# Patient Record
Sex: FEMALE | Race: WHITE | NOT HISPANIC OR LATINO | URBAN - METROPOLITAN AREA
[De-identification: names, ages, dates, MRNs, and addresses within clinical notes are randomized per-mention and may not be internally consistent; named-entity substitution may affect disease eponyms.]

---

## 2019-01-25 ENCOUNTER — OUTPATIENT (OUTPATIENT)
Dept: OUTPATIENT SERVICES | Facility: HOSPITAL | Age: 34
LOS: 1 days | End: 2019-01-25

## 2019-01-25 DIAGNOSIS — Z3A.00 WEEKS OF GESTATION OF PREGNANCY NOT SPECIFIED: ICD-10-CM

## 2019-01-25 DIAGNOSIS — O26.899 OTHER SPECIFIED PREGNANCY RELATED CONDITIONS, UNSPECIFIED TRIMESTER: ICD-10-CM

## 2019-01-25 PROCEDURE — 99214 OFFICE O/P EST MOD 30 MIN: CPT

## 2019-01-26 ENCOUNTER — OUTPATIENT (OUTPATIENT)
Dept: OUTPATIENT SERVICES | Facility: HOSPITAL | Age: 34
LOS: 1 days | End: 2019-01-26
Payer: COMMERCIAL

## 2019-01-26 DIAGNOSIS — Z3A.00 WEEKS OF GESTATION OF PREGNANCY NOT SPECIFIED: ICD-10-CM

## 2019-01-26 DIAGNOSIS — O26.899 OTHER SPECIFIED PREGNANCY RELATED CONDITIONS, UNSPECIFIED TRIMESTER: ICD-10-CM

## 2019-01-26 PROCEDURE — 76818 FETAL BIOPHYS PROFILE W/NST: CPT

## 2019-01-26 PROCEDURE — 99214 OFFICE O/P EST MOD 30 MIN: CPT

## 2019-02-04 ENCOUNTER — INPATIENT (INPATIENT)
Facility: HOSPITAL | Age: 34
LOS: 3 days | Discharge: HOME | End: 2019-02-08
Attending: OBSTETRICS & GYNECOLOGY | Admitting: OBSTETRICS & GYNECOLOGY
Payer: COMMERCIAL

## 2019-02-04 VITALS — WEIGHT: 200.62 LBS | HEIGHT: 64 IN

## 2019-02-04 DIAGNOSIS — O26.899 OTHER SPECIFIED PREGNANCY RELATED CONDITIONS, UNSPECIFIED TRIMESTER: ICD-10-CM

## 2019-02-04 DIAGNOSIS — Z3A.00 WEEKS OF GESTATION OF PREGNANCY NOT SPECIFIED: ICD-10-CM

## 2019-02-04 LAB
ALBUMIN SERPL ELPH-MCNC: 3.8 G/DL — SIGNIFICANT CHANGE UP (ref 3.3–5)
ALP SERPL-CCNC: 122 U/L — HIGH (ref 40–120)
ALT FLD-CCNC: 16 U/L — SIGNIFICANT CHANGE UP (ref 10–45)
AMNISURE ROM (RUPTURE OF MEMBRANES): NEGATIVE — SIGNIFICANT CHANGE UP
ANION GAP SERPL CALC-SCNC: 15 MMOL/L — SIGNIFICANT CHANGE UP (ref 5–17)
APPEARANCE UR: ABNORMAL
APTT BLD: 30.6 SEC — SIGNIFICANT CHANGE UP (ref 27.5–36.3)
AST SERPL-CCNC: 16 U/L — SIGNIFICANT CHANGE UP (ref 10–40)
BASOPHILS NFR BLD AUTO: 0.1 % — SIGNIFICANT CHANGE UP (ref 0–2)
BILIRUB SERPL-MCNC: 0.3 MG/DL — SIGNIFICANT CHANGE UP (ref 0.2–1.2)
BILIRUB UR-MCNC: NEGATIVE — SIGNIFICANT CHANGE UP
BLD GP AB SCN SERPL QL: NEGATIVE — SIGNIFICANT CHANGE UP
BUN SERPL-MCNC: 8 MG/DL — SIGNIFICANT CHANGE UP (ref 7–23)
CALCIUM SERPL-MCNC: 9.4 MG/DL — SIGNIFICANT CHANGE UP (ref 8.4–10.5)
CHLORIDE SERPL-SCNC: 103 MMOL/L — SIGNIFICANT CHANGE UP (ref 96–108)
CO2 SERPL-SCNC: 20 MMOL/L — LOW (ref 22–31)
COLOR SPEC: YELLOW — SIGNIFICANT CHANGE UP
CREAT ?TM UR-MCNC: 21 MG/DL — SIGNIFICANT CHANGE UP
CREAT SERPL-MCNC: 0.41 MG/DL — LOW (ref 0.5–1.3)
DIFF PNL FLD: ABNORMAL
EOSINOPHIL NFR BLD AUTO: 0.3 % — SIGNIFICANT CHANGE UP (ref 0–6)
FIBRINOGEN PPP-MCNC: 569 MG/DL — HIGH (ref 258–438)
GLUCOSE SERPL-MCNC: 81 MG/DL — SIGNIFICANT CHANGE UP (ref 70–99)
GLUCOSE UR QL: NEGATIVE — SIGNIFICANT CHANGE UP
HCT VFR BLD CALC: 39.7 % — SIGNIFICANT CHANGE UP (ref 34.5–45)
HGB BLD-MCNC: 13 G/DL — SIGNIFICANT CHANGE UP (ref 11.5–15.5)
INR BLD: 0.98 — SIGNIFICANT CHANGE UP (ref 0.88–1.16)
KETONES UR-MCNC: NEGATIVE — SIGNIFICANT CHANGE UP
LDH SERPL L TO P-CCNC: 166 U/L — SIGNIFICANT CHANGE UP (ref 50–242)
LEUKOCYTE ESTERASE UR-ACNC: ABNORMAL
LYMPHOCYTES # BLD AUTO: 7 % — LOW (ref 13–44)
MCHC RBC-ENTMCNC: 28.6 PG — SIGNIFICANT CHANGE UP (ref 27–34)
MCHC RBC-ENTMCNC: 32.7 G/DL — SIGNIFICANT CHANGE UP (ref 32–36)
MCV RBC AUTO: 87.4 FL — SIGNIFICANT CHANGE UP (ref 80–100)
MONOCYTES NFR BLD AUTO: 7.4 % — SIGNIFICANT CHANGE UP (ref 2–14)
NEUTROPHILS NFR BLD AUTO: 85.2 % — HIGH (ref 43–77)
NITRITE UR-MCNC: NEGATIVE — SIGNIFICANT CHANGE UP
PH UR: 6.5 — SIGNIFICANT CHANGE UP (ref 5–8)
PLATELET # BLD AUTO: 240 K/UL — SIGNIFICANT CHANGE UP (ref 150–400)
POTASSIUM SERPL-MCNC: 4.3 MMOL/L — SIGNIFICANT CHANGE UP (ref 3.5–5.3)
POTASSIUM SERPL-SCNC: 4.3 MMOL/L — SIGNIFICANT CHANGE UP (ref 3.5–5.3)
PROT ?TM UR-MCNC: 10 MG/DL — SIGNIFICANT CHANGE UP (ref 0–12)
PROT SERPL-MCNC: 7.6 G/DL — SIGNIFICANT CHANGE UP (ref 6–8.3)
PROT UR-MCNC: NEGATIVE MG/DL — SIGNIFICANT CHANGE UP
PROT/CREAT UR-RTO: 0.5 RATIO — HIGH (ref 0–0.2)
PROTHROM AB SERPL-ACNC: 11.1 SEC — SIGNIFICANT CHANGE UP (ref 10–12.9)
RBC # BLD: 4.54 M/UL — SIGNIFICANT CHANGE UP (ref 3.8–5.2)
RBC # FLD: 14.9 % — SIGNIFICANT CHANGE UP (ref 10.3–16.9)
RH IG SCN BLD-IMP: POSITIVE — SIGNIFICANT CHANGE UP
RH IG SCN BLD-IMP: POSITIVE — SIGNIFICANT CHANGE UP
SODIUM SERPL-SCNC: 138 MMOL/L — SIGNIFICANT CHANGE UP (ref 135–145)
SP GR SPEC: 1.01 — SIGNIFICANT CHANGE UP (ref 1–1.03)
URATE SERPL-MCNC: 4.3 MG/DL — SIGNIFICANT CHANGE UP (ref 2.5–7)
UROBILINOGEN FLD QL: 0.2 E.U./DL — SIGNIFICANT CHANGE UP
WBC # BLD: 18.4 K/UL — HIGH (ref 3.8–10.5)
WBC # FLD AUTO: 18.4 K/UL — HIGH (ref 3.8–10.5)

## 2019-02-04 RX ORDER — OXYCODONE AND ACETAMINOPHEN 5; 325 MG/1; MG/1
2 TABLET ORAL EVERY 6 HOURS
Qty: 0 | Refills: 0 | Status: DISCONTINUED | OUTPATIENT
Start: 2019-02-04 | End: 2019-02-08

## 2019-02-04 RX ORDER — OXYTOCIN 10 UNIT/ML
41.67 VIAL (ML) INJECTION
Qty: 20 | Refills: 0 | Status: DISCONTINUED | OUTPATIENT
Start: 2019-02-04 | End: 2019-02-08

## 2019-02-04 RX ORDER — PENICILLIN G POTASSIUM 5000000 [IU]/1
POWDER, FOR SOLUTION INTRAMUSCULAR; INTRAPLEURAL; INTRATHECAL; INTRAVENOUS
Qty: 0 | Refills: 0 | Status: DISCONTINUED | OUTPATIENT
Start: 2019-02-04 | End: 2019-02-08

## 2019-02-04 RX ORDER — PENICILLIN G POTASSIUM 5000000 [IU]/1
2.5 POWDER, FOR SOLUTION INTRAMUSCULAR; INTRAPLEURAL; INTRATHECAL; INTRAVENOUS EVERY 4 HOURS
Qty: 0 | Refills: 0 | Status: DISCONTINUED | OUTPATIENT
Start: 2019-02-04 | End: 2019-02-08

## 2019-02-04 RX ORDER — HEPARIN SODIUM 5000 [USP'U]/ML
5000 INJECTION INTRAVENOUS; SUBCUTANEOUS EVERY 12 HOURS
Qty: 0 | Refills: 0 | Status: DISCONTINUED | OUTPATIENT
Start: 2019-02-05 | End: 2019-02-08

## 2019-02-04 RX ORDER — MORPHINE SULFATE 50 MG/1
0.3 CAPSULE, EXTENDED RELEASE ORAL ONCE
Qty: 0 | Refills: 0 | Status: DISCONTINUED | OUTPATIENT
Start: 2019-02-04 | End: 2019-02-08

## 2019-02-04 RX ORDER — PENICILLIN G POTASSIUM 5000000 [IU]/1
5 POWDER, FOR SOLUTION INTRAMUSCULAR; INTRAPLEURAL; INTRATHECAL; INTRAVENOUS ONCE
Qty: 0 | Refills: 0 | Status: COMPLETED | OUTPATIENT
Start: 2019-02-04 | End: 2019-02-04

## 2019-02-04 RX ORDER — OXYCODONE AND ACETAMINOPHEN 5; 325 MG/1; MG/1
1 TABLET ORAL
Qty: 0 | Refills: 0 | Status: DISCONTINUED | OUTPATIENT
Start: 2019-02-04 | End: 2019-02-08

## 2019-02-04 RX ORDER — TETANUS TOXOID, REDUCED DIPHTHERIA TOXOID AND ACELLULAR PERTUSSIS VACCINE, ADSORBED 5; 2.5; 8; 8; 2.5 [IU]/.5ML; [IU]/.5ML; UG/.5ML; UG/.5ML; UG/.5ML
0.5 SUSPENSION INTRAMUSCULAR ONCE
Qty: 0 | Refills: 0 | Status: COMPLETED | OUTPATIENT
Start: 2019-02-04 | End: 2020-01-03

## 2019-02-04 RX ORDER — ACETAMINOPHEN 500 MG
650 TABLET ORAL EVERY 6 HOURS
Qty: 0 | Refills: 0 | Status: DISCONTINUED | OUTPATIENT
Start: 2019-02-04 | End: 2019-02-08

## 2019-02-04 RX ORDER — OXYCODONE HYDROCHLORIDE 5 MG/1
5 TABLET ORAL
Qty: 0 | Refills: 0 | Status: DISCONTINUED | OUTPATIENT
Start: 2019-02-04 | End: 2019-02-08

## 2019-02-04 RX ORDER — LANOLIN
1 OINTMENT (GRAM) TOPICAL
Qty: 0 | Refills: 0 | Status: DISCONTINUED | OUTPATIENT
Start: 2019-02-04 | End: 2019-02-08

## 2019-02-04 RX ORDER — OXYCODONE HYDROCHLORIDE 5 MG/1
10 TABLET ORAL
Qty: 0 | Refills: 0 | Status: DISCONTINUED | OUTPATIENT
Start: 2019-02-04 | End: 2019-02-08

## 2019-02-04 RX ORDER — IBUPROFEN 200 MG
600 TABLET ORAL EVERY 6 HOURS
Qty: 0 | Refills: 0 | Status: DISCONTINUED | OUTPATIENT
Start: 2019-02-04 | End: 2019-02-08

## 2019-02-04 RX ORDER — SODIUM CHLORIDE 9 MG/ML
1000 INJECTION, SOLUTION INTRAVENOUS
Qty: 0 | Refills: 0 | Status: DISCONTINUED | OUTPATIENT
Start: 2019-02-04 | End: 2019-02-08

## 2019-02-04 RX ORDER — CITRIC ACID/SODIUM CITRATE 300-500 MG
15 SOLUTION, ORAL ORAL EVERY 4 HOURS
Qty: 0 | Refills: 0 | Status: DISCONTINUED | OUTPATIENT
Start: 2019-02-04 | End: 2019-02-04

## 2019-02-04 RX ORDER — CEFAZOLIN SODIUM 1 G
2000 VIAL (EA) INJECTION ONCE
Qty: 0 | Refills: 0 | Status: COMPLETED | OUTPATIENT
Start: 2019-02-04 | End: 2019-02-04

## 2019-02-04 RX ORDER — SODIUM CHLORIDE 9 MG/ML
1000 INJECTION, SOLUTION INTRAVENOUS ONCE
Qty: 0 | Refills: 0 | Status: COMPLETED | OUTPATIENT
Start: 2019-02-04 | End: 2019-02-04

## 2019-02-04 RX ORDER — FERROUS SULFATE 325(65) MG
325 TABLET ORAL DAILY
Qty: 0 | Refills: 0 | Status: DISCONTINUED | OUTPATIENT
Start: 2019-02-04 | End: 2019-02-08

## 2019-02-04 RX ORDER — AZITHROMYCIN 500 MG/1
500 TABLET, FILM COATED ORAL ONCE
Qty: 0 | Refills: 0 | Status: COMPLETED | OUTPATIENT
Start: 2019-02-04 | End: 2019-02-04

## 2019-02-04 RX ORDER — SIMETHICONE 80 MG/1
80 TABLET, CHEWABLE ORAL EVERY 4 HOURS
Qty: 0 | Refills: 0 | Status: DISCONTINUED | OUTPATIENT
Start: 2019-02-04 | End: 2019-02-08

## 2019-02-04 RX ORDER — HYDROMORPHONE HYDROCHLORIDE 2 MG/ML
1 INJECTION INTRAMUSCULAR; INTRAVENOUS; SUBCUTANEOUS
Qty: 0 | Refills: 0 | Status: DISCONTINUED | OUTPATIENT
Start: 2019-02-04 | End: 2019-02-08

## 2019-02-04 RX ORDER — NALOXONE HYDROCHLORIDE 4 MG/.1ML
0.1 SPRAY NASAL
Qty: 0 | Refills: 0 | Status: DISCONTINUED | OUTPATIENT
Start: 2019-02-04 | End: 2019-02-08

## 2019-02-04 RX ORDER — DOCUSATE SODIUM 100 MG
100 CAPSULE ORAL
Qty: 0 | Refills: 0 | Status: DISCONTINUED | OUTPATIENT
Start: 2019-02-04 | End: 2019-02-08

## 2019-02-04 RX ORDER — IBUPROFEN 200 MG
600 TABLET ORAL ONCE
Qty: 0 | Refills: 0 | Status: DISCONTINUED | OUTPATIENT
Start: 2019-02-04 | End: 2019-02-08

## 2019-02-04 RX ORDER — OXYTOCIN 10 UNIT/ML
333.33 VIAL (ML) INJECTION
Qty: 20 | Refills: 0 | Status: DISCONTINUED | OUTPATIENT
Start: 2019-02-04 | End: 2019-02-04

## 2019-02-04 RX ORDER — GLYCERIN ADULT
1 SUPPOSITORY, RECTAL RECTAL AT BEDTIME
Qty: 0 | Refills: 0 | Status: DISCONTINUED | OUTPATIENT
Start: 2019-02-04 | End: 2019-02-08

## 2019-02-04 RX ORDER — ONDANSETRON 8 MG/1
4 TABLET, FILM COATED ORAL EVERY 6 HOURS
Qty: 0 | Refills: 0 | Status: DISCONTINUED | OUTPATIENT
Start: 2019-02-04 | End: 2019-02-08

## 2019-02-04 RX ORDER — DIPHENHYDRAMINE HCL 50 MG
25 CAPSULE ORAL EVERY 6 HOURS
Qty: 0 | Refills: 0 | Status: DISCONTINUED | OUTPATIENT
Start: 2019-02-04 | End: 2019-02-08

## 2019-02-04 RX ORDER — AZITHROMYCIN 500 MG/1
500 TABLET, FILM COATED ORAL ONCE
Qty: 0 | Refills: 0 | Status: DISCONTINUED | OUTPATIENT
Start: 2019-02-04 | End: 2019-02-04

## 2019-02-04 RX ORDER — SODIUM CHLORIDE 9 MG/ML
1000 INJECTION, SOLUTION INTRAVENOUS
Qty: 0 | Refills: 0 | Status: DISCONTINUED | OUTPATIENT
Start: 2019-02-04 | End: 2019-02-04

## 2019-02-04 RX ADMIN — SODIUM CHLORIDE 2000 MILLILITER(S): 9 INJECTION, SOLUTION INTRAVENOUS at 17:00

## 2019-02-04 RX ADMIN — Medication 100 MILLIGRAM(S): at 19:15

## 2019-02-04 RX ADMIN — SODIUM CHLORIDE 125 MILLILITER(S): 9 INJECTION, SOLUTION INTRAVENOUS at 16:16

## 2019-02-04 RX ADMIN — PENICILLIN G POTASSIUM 100 MILLION UNIT(S): 5000000 POWDER, FOR SOLUTION INTRAMUSCULAR; INTRAPLEURAL; INTRATHECAL; INTRAVENOUS at 16:50

## 2019-02-04 RX ADMIN — AZITHROMYCIN 255 MILLIGRAM(S): 500 TABLET, FILM COATED ORAL at 19:45

## 2019-02-05 LAB
BASOPHILS NFR BLD AUTO: 2 % — SIGNIFICANT CHANGE UP (ref 0–2)
HCT VFR BLD CALC: 32.6 % — LOW (ref 34.5–45)
HGB BLD-MCNC: 10.5 G/DL — LOW (ref 11.5–15.5)
LYMPHOCYTES # BLD AUTO: 7 % — LOW (ref 13–44)
MCHC RBC-ENTMCNC: 28 PG — SIGNIFICANT CHANGE UP (ref 27–34)
MCHC RBC-ENTMCNC: 32.2 G/DL — SIGNIFICANT CHANGE UP (ref 32–36)
MCV RBC AUTO: 86.9 FL — SIGNIFICANT CHANGE UP (ref 80–100)
MONOCYTES NFR BLD AUTO: 2 % — SIGNIFICANT CHANGE UP (ref 2–14)
NEUTROPHILS NFR BLD AUTO: 82 % — HIGH (ref 43–77)
PLATELET # BLD AUTO: 231 K/UL — SIGNIFICANT CHANGE UP (ref 150–400)
RBC # BLD: 3.75 M/UL — LOW (ref 3.8–5.2)
RBC # FLD: 14.7 % — SIGNIFICANT CHANGE UP (ref 10.3–16.9)
T PALLIDUM AB TITR SER: NEGATIVE — SIGNIFICANT CHANGE UP
WBC # BLD: 22.3 K/UL — HIGH (ref 3.8–10.5)
WBC # FLD AUTO: 22.3 K/UL — HIGH (ref 3.8–10.5)

## 2019-02-05 RX ADMIN — Medication 600 MILLIGRAM(S): at 01:41

## 2019-02-05 RX ADMIN — Medication 1 TABLET(S): at 12:36

## 2019-02-05 RX ADMIN — Medication 600 MILLIGRAM(S): at 15:49

## 2019-02-05 RX ADMIN — Medication 100 MILLIGRAM(S): at 22:22

## 2019-02-05 RX ADMIN — HEPARIN SODIUM 5000 UNIT(S): 5000 INJECTION INTRAVENOUS; SUBCUTANEOUS at 09:14

## 2019-02-05 RX ADMIN — Medication 600 MILLIGRAM(S): at 09:15

## 2019-02-05 RX ADMIN — Medication 325 MILLIGRAM(S): at 12:36

## 2019-02-05 RX ADMIN — Medication 600 MILLIGRAM(S): at 09:56

## 2019-02-05 RX ADMIN — SIMETHICONE 80 MILLIGRAM(S): 80 TABLET, CHEWABLE ORAL at 22:22

## 2019-02-05 RX ADMIN — HEPARIN SODIUM 5000 UNIT(S): 5000 INJECTION INTRAVENOUS; SUBCUTANEOUS at 22:22

## 2019-02-05 RX ADMIN — Medication 600 MILLIGRAM(S): at 15:09

## 2019-02-05 RX ADMIN — Medication 600 MILLIGRAM(S): at 22:28

## 2019-02-05 RX ADMIN — Medication 600 MILLIGRAM(S): at 02:40

## 2019-02-05 RX ADMIN — Medication 600 MILLIGRAM(S): at 21:58

## 2019-02-05 NOTE — PROGRESS NOTE ADULT - ATTENDING COMMENTS
10.5   22.3  )-----------( 231      ( 05 Feb 2019 06:03 )             32.6   doing well  pod 1  oob  reg diet when passing flatus

## 2019-02-05 NOTE — PROGRESS NOTE ADULT - ASSESSMENT
33y Female POD#1   s/p C/S, Uncomplicated                                       1. Neuro/Pain:  OPM  2  CV:  VS per routine  3. Pulm: Encourage ISS & Ambulation  4. GI:  Advance as michelle  5. : Hill in place, TOV later today after removal of hill  6. DVT ppx: SCDs, SQH 5000 mg BID  7. Dispo: POD #3 or #4

## 2019-02-05 NOTE — LACTATION INITIAL EVALUATION - NS LACT CON REASON FOR REQ
Mother reports baby has been attempting to feed but she is unsure if baby has actually latched. Positioning and deep latch strategies taught, and with assist and many attempts, baby was able to latch deeply on left breast in cross cradle hold, sucking rhythmically between pauses of rest. Mother reports no pain with latch. Breastfeeding basics and normal  behavior reviewed, including input/output expectations. Mother instructed to breastfeed on demand at least 8-12x/day, perform plenty of S2S contact, room-in. Answered questions and provided outside lactation support resources. Mother knows to ask for LC assistance as needed. Baby 39.4 GA, currently 17 hours old, 2940 grams,  2 urine/4 stool diapers, no recorded weight loss. Grandmother seems to be somewhat overbearing and has suggested formula, though mother does not have an interest in it./primaparous mom

## 2019-02-05 NOTE — PROGRESS NOTE ADULT - SUBJECTIVE AND OBJECTIVE BOX
Patient evaluated at bedside.   She reports pain is well controlled.  She denies headache, dizziness, chest pain, palpitations, shortness of breath, nausea, vomiting or heavy vaginal bleeding.  She has not yet ambulated, has hill in place, is not yet passing gas and is tolerating clears.  Physical Exam:  Vital Signs Last 24 Hrs  T(C): 37 (05 Feb 2019 02:00), Max: 37.3 (04 Feb 2019 23:15)  T(F): 98.6 (05 Feb 2019 02:00), Max: 99.2 (04 Feb 2019 23:15)  HR: 93 (05 Feb 2019 02:00) (85 - 98)  BP: 111/72 (05 Feb 2019 02:00) (103/51 - 123/78)  BP(mean): --  RR: 17 (05 Feb 2019 02:00) (17 - 20)  SpO2: 97% (05 Feb 2019 02:00) (97% - 98%)    02-04 @ 07:01  -  02-05 @ 06:16  --------------------------------------------------------  IN: 2700 mL / OUT: 1640 mL / NET: 1060 mL        GA: NAD, A+0 x 3  CV: RRR  Pulm: Normal work of breathing  Breasts: soft, nontender, no palpable masses  Abd: + BS, soft, nontender, nondistended, no rebound or guarding, uterus firm at midline,  fundus below umbilicus  Dressing clean/dry/intact  : lochia WNL  Extremities: no swelling or calf tenderness                            13.0   18.4  )-----------( 240      ( 04 Feb 2019 16:45 )             39.7     02-04    138  |  103  |  8   ----------------------------<  81  4.3   |  20<L>  |  0.41<L>    Ca    9.4      04 Feb 2019 16:44    TPro  7.6  /  Alb  3.8  /  TBili  0.3  /  DBili  x   /  AST  16  /  ALT  16  /  AlkPhos  122<H>  02-04      PT/INR - ( 04 Feb 2019 16:44 )   PT: 11.1 sec;   INR: 0.98          PTT - ( 04 Feb 2019 16:44 )  PTT:30.6 sec

## 2019-02-06 RX ADMIN — Medication 1 TABLET(S): at 12:20

## 2019-02-06 RX ADMIN — Medication 650 MILLIGRAM(S): at 18:02

## 2019-02-06 RX ADMIN — Medication 650 MILLIGRAM(S): at 13:38

## 2019-02-06 RX ADMIN — Medication 600 MILLIGRAM(S): at 09:06

## 2019-02-06 RX ADMIN — Medication 600 MILLIGRAM(S): at 10:00

## 2019-02-06 RX ADMIN — Medication 600 MILLIGRAM(S): at 21:50

## 2019-02-06 RX ADMIN — Medication 650 MILLIGRAM(S): at 19:00

## 2019-02-06 RX ADMIN — HEPARIN SODIUM 5000 UNIT(S): 5000 INJECTION INTRAVENOUS; SUBCUTANEOUS at 21:11

## 2019-02-06 RX ADMIN — Medication 600 MILLIGRAM(S): at 21:11

## 2019-02-06 RX ADMIN — Medication 650 MILLIGRAM(S): at 12:20

## 2019-02-06 RX ADMIN — Medication 600 MILLIGRAM(S): at 03:55

## 2019-02-06 RX ADMIN — Medication 650 MILLIGRAM(S): at 01:00

## 2019-02-06 RX ADMIN — Medication 650 MILLIGRAM(S): at 06:48

## 2019-02-06 RX ADMIN — Medication 600 MILLIGRAM(S): at 03:25

## 2019-02-06 RX ADMIN — Medication 650 MILLIGRAM(S): at 07:18

## 2019-02-06 RX ADMIN — HEPARIN SODIUM 5000 UNIT(S): 5000 INJECTION INTRAVENOUS; SUBCUTANEOUS at 09:06

## 2019-02-06 RX ADMIN — Medication 600 MILLIGRAM(S): at 16:44

## 2019-02-06 RX ADMIN — Medication 650 MILLIGRAM(S): at 00:30

## 2019-02-06 RX ADMIN — Medication 325 MILLIGRAM(S): at 12:20

## 2019-02-06 RX ADMIN — SIMETHICONE 80 MILLIGRAM(S): 80 TABLET, CHEWABLE ORAL at 03:25

## 2019-02-06 RX ADMIN — Medication 600 MILLIGRAM(S): at 15:39

## 2019-02-06 NOTE — PROGRESS NOTE ADULT - SUBJECTIVE AND OBJECTIVE BOX
Patient evaluated at bedside.   She reports pain is well controlled.  She denies headache, dizziness, chest pain, palpitations, shortness of breathe, nausea, vomiting or heavy vaginal bleeding.  She has been ambulating without assistance, voiding spontaneously, passing gas, tolerating regular diet and is breastfeeding.    Physical Exam:  Vital Signs Last 24 Hrs  T(C): 36.8 (06 Feb 2019 01:59), Max: 37 (05 Feb 2019 10:00)  T(F): 98.2 (06 Feb 2019 01:59), Max: 98.6 (05 Feb 2019 10:00)  HR: 93 (06 Feb 2019 01:59) (90 - 99)  BP: 106/69 (06 Feb 2019 01:59) (92/59 - 114/58)  BP(mean): --  RR: 17 (06 Feb 2019 01:59) (17 - 18)  SpO2: 97% (06 Feb 2019 01:59) (96% - 100%)    02-05 @ 07:01  -  02-06 @ 07:00  --------------------------------------------------------  IN: 0 mL / OUT: 1950 mL / NET: -1950 mL        GA: NAD, A+0 x 3  CV: RRR  Pulm: Normal work of breathing  Breasts: soft, nontender, no palpable masses  Abd: + BS, soft, nontender, nondistended, no rebound or guarding, uterus firm at midline,  fundus below umbilicus  Incision: well approximated, no erythema or discharge  : lochia WNL  Extremities: no swelling or calf tenderness                            10.5   22.3  )-----------( 231      ( 05 Feb 2019 06:03 )             32.6     02-04    138  |  103  |  8   ----------------------------<  81  4.3   |  20<L>  |  0.41<L>    Ca    9.4      04 Feb 2019 16:44    TPro  7.6  /  Alb  3.8  /  TBili  0.3  /  DBili  x   /  AST  16  /  ALT  16  /  AlkPhos  122<H>  02-04      PT/INR - ( 04 Feb 2019 16:44 )   PT: 11.1 sec;   INR: 0.98          PTT - ( 04 Feb 2019 16:44 )  PTT:30.6 sec

## 2019-02-06 NOTE — PROGRESS NOTE ADULT - ASSESSMENT
33y Female POD#2    s/p C/S, Uncomplicated                                       1. Neuro/Pain:  OPM  2  CV:  VS per routine  3. Pulm: Encourage ISS & Ambulation  4. GI:  Reg  5. : Voiding  6. DVT ppx: SCDs, SQH 5000 mg BID  7. Dispo: POD #3 or #4

## 2019-02-07 RX ORDER — TETANUS TOXOID, REDUCED DIPHTHERIA TOXOID AND ACELLULAR PERTUSSIS VACCINE, ADSORBED 5; 2.5; 8; 8; 2.5 [IU]/.5ML; [IU]/.5ML; UG/.5ML; UG/.5ML; UG/.5ML
0.5 SUSPENSION INTRAMUSCULAR ONCE
Qty: 0 | Refills: 0 | Status: COMPLETED | OUTPATIENT
Start: 2019-02-07 | End: 2019-02-07

## 2019-02-07 RX ADMIN — Medication 650 MILLIGRAM(S): at 07:02

## 2019-02-07 RX ADMIN — Medication 1 TABLET(S): at 12:24

## 2019-02-07 RX ADMIN — Medication 650 MILLIGRAM(S): at 06:06

## 2019-02-07 RX ADMIN — Medication 600 MILLIGRAM(S): at 09:46

## 2019-02-07 RX ADMIN — TETANUS TOXOID, REDUCED DIPHTHERIA TOXOID AND ACELLULAR PERTUSSIS VACCINE, ADSORBED 0.5 MILLILITER(S): 5; 2.5; 8; 8; 2.5 SUSPENSION INTRAMUSCULAR at 22:44

## 2019-02-07 RX ADMIN — Medication 650 MILLIGRAM(S): at 12:24

## 2019-02-07 RX ADMIN — Medication 650 MILLIGRAM(S): at 19:00

## 2019-02-07 RX ADMIN — HEPARIN SODIUM 5000 UNIT(S): 5000 INJECTION INTRAVENOUS; SUBCUTANEOUS at 09:07

## 2019-02-07 RX ADMIN — Medication 600 MILLIGRAM(S): at 04:00

## 2019-02-07 RX ADMIN — Medication 600 MILLIGRAM(S): at 09:07

## 2019-02-07 RX ADMIN — Medication 600 MILLIGRAM(S): at 15:04

## 2019-02-07 RX ADMIN — Medication 600 MILLIGRAM(S): at 21:08

## 2019-02-07 RX ADMIN — Medication 600 MILLIGRAM(S): at 16:00

## 2019-02-07 RX ADMIN — Medication 600 MILLIGRAM(S): at 03:28

## 2019-02-07 RX ADMIN — Medication 100 MILLIGRAM(S): at 21:10

## 2019-02-07 RX ADMIN — Medication 325 MILLIGRAM(S): at 12:24

## 2019-02-07 RX ADMIN — HEPARIN SODIUM 5000 UNIT(S): 5000 INJECTION INTRAVENOUS; SUBCUTANEOUS at 21:08

## 2019-02-07 RX ADMIN — Medication 650 MILLIGRAM(S): at 01:00

## 2019-02-07 RX ADMIN — Medication 650 MILLIGRAM(S): at 13:10

## 2019-02-07 RX ADMIN — Medication 600 MILLIGRAM(S): at 22:08

## 2019-02-07 RX ADMIN — Medication 650 MILLIGRAM(S): at 00:40

## 2019-02-07 RX ADMIN — Medication 650 MILLIGRAM(S): at 18:19

## 2019-02-07 NOTE — PROGRESS NOTE ADULT - SUBJECTIVE AND OBJECTIVE BOX
Patient evaluated at bedside.   She reports pain is well controlled.  She denies headache, dizziness, chest pain, palpitations, shortness of breathe, nausea, vomiting or heavy vaginal bleeding.  She has been ambulating without assistance, voiding spontaneously, passing gas, tolerating regular diet and is breastfeeding.    Physical Exam:  Vital Signs Last 24 Hrs  T(C): 36.3 (07 Feb 2019 06:00), Max: 36.7 (06 Feb 2019 19:40)  T(F): 97.4 (07 Feb 2019 06:00), Max: 98 (06 Feb 2019 19:40)  HR: 85 (07 Feb 2019 06:00) (85 - 100)  BP: 113/73 (07 Feb 2019 06:00) (106/73 - 113/73)  BP(mean): --  RR: 17 (07 Feb 2019 06:00) (17 - 18)  SpO2: 98% (07 Feb 2019 06:00) (98% - 99%)      GA: NAD, A+0 x 3  CV: RRR  Pulm: Normal work of breathing  Breasts: soft, nontender, no palpable masses  Abd: + BS, soft, nontender, nondistended, no rebound or guarding, uterus firm at midline,  fundus below umbilicus  Incision: well approximated, no erythema or discharge  : lochia WNL  Extremities: no swelling or calf tenderness

## 2019-02-07 NOTE — PROGRESS NOTE ADULT - ASSESSMENT
33y Female POD# 3   s/p C/S, Uncomplicated                                       1. Neuro/Pain:  OPM  2  CV:  VS per routine  3. Pulm: Encourage ISS & Ambulation  4. GI:  Reg  5. : Voiding  6. DVT ppx: SCDs, SQH 5000 mg BID  7. Dispo: POD #3 or #4

## 2019-02-08 ENCOUNTER — TRANSCRIPTION ENCOUNTER (OUTPATIENT)
Age: 34
End: 2019-02-08

## 2019-02-08 VITALS
HEART RATE: 81 BPM | TEMPERATURE: 97 F | DIASTOLIC BLOOD PRESSURE: 85 MMHG | OXYGEN SATURATION: 100 % | SYSTOLIC BLOOD PRESSURE: 130 MMHG | RESPIRATION RATE: 18 BRPM

## 2019-02-08 LAB — SURGICAL PATHOLOGY STUDY: SIGNIFICANT CHANGE UP

## 2019-02-08 PROCEDURE — 83615 LACTATE (LD) (LDH) ENZYME: CPT

## 2019-02-08 PROCEDURE — 88307 TISSUE EXAM BY PATHOLOGIST: CPT

## 2019-02-08 PROCEDURE — 82570 ASSAY OF URINE CREATININE: CPT

## 2019-02-08 PROCEDURE — 84550 ASSAY OF BLOOD/URIC ACID: CPT

## 2019-02-08 PROCEDURE — C1889: CPT

## 2019-02-08 PROCEDURE — 99214 OFFICE O/P EST MOD 30 MIN: CPT

## 2019-02-08 PROCEDURE — 85730 THROMBOPLASTIN TIME PARTIAL: CPT

## 2019-02-08 PROCEDURE — 86900 BLOOD TYPING SEROLOGIC ABO: CPT

## 2019-02-08 PROCEDURE — 36415 COLL VENOUS BLD VENIPUNCTURE: CPT

## 2019-02-08 PROCEDURE — 90715 TDAP VACCINE 7 YRS/> IM: CPT

## 2019-02-08 PROCEDURE — 86592 SYPHILIS TEST NON-TREP QUAL: CPT

## 2019-02-08 PROCEDURE — 81001 URINALYSIS AUTO W/SCOPE: CPT

## 2019-02-08 PROCEDURE — 86850 RBC ANTIBODY SCREEN: CPT

## 2019-02-08 PROCEDURE — 85610 PROTHROMBIN TIME: CPT

## 2019-02-08 PROCEDURE — 86780 TREPONEMA PALLIDUM: CPT

## 2019-02-08 PROCEDURE — 85025 COMPLETE CBC W/AUTO DIFF WBC: CPT

## 2019-02-08 PROCEDURE — 80053 COMPREHEN METABOLIC PANEL: CPT

## 2019-02-08 PROCEDURE — C1765: CPT

## 2019-02-08 PROCEDURE — 84156 ASSAY OF PROTEIN URINE: CPT

## 2019-02-08 PROCEDURE — 85384 FIBRINOGEN ACTIVITY: CPT

## 2019-02-08 PROCEDURE — 84112 EVAL AMNIOTIC FLUID PROTEIN: CPT

## 2019-02-08 PROCEDURE — 86901 BLOOD TYPING SEROLOGIC RH(D): CPT

## 2019-02-08 RX ORDER — THYROID 120 MG
1 TABLET ORAL
Qty: 0 | Refills: 0 | COMMUNITY

## 2019-02-08 RX ADMIN — HEPARIN SODIUM 5000 UNIT(S): 5000 INJECTION INTRAVENOUS; SUBCUTANEOUS at 09:15

## 2019-02-08 RX ADMIN — Medication 325 MILLIGRAM(S): at 09:14

## 2019-02-08 RX ADMIN — Medication 650 MILLIGRAM(S): at 07:34

## 2019-02-08 RX ADMIN — Medication 600 MILLIGRAM(S): at 13:37

## 2019-02-08 RX ADMIN — Medication 650 MILLIGRAM(S): at 07:04

## 2019-02-08 RX ADMIN — Medication 1 TABLET(S): at 09:14

## 2019-02-08 RX ADMIN — Medication 600 MILLIGRAM(S): at 03:51

## 2019-02-08 RX ADMIN — Medication 600 MILLIGRAM(S): at 04:51

## 2019-02-08 RX ADMIN — Medication 600 MILLIGRAM(S): at 14:18

## 2019-02-08 NOTE — DISCHARGE NOTE OB - PATIENT PORTAL LINK FT
You can access the [facility] Patient Portal, offered by [facility], by registering with the following website: [email or web address]

## 2019-02-08 NOTE — DISCHARGE NOTE OB - CARE PROVIDER_API CALL
Laura Gallagher (DO)  Obstetrics and Gynecology  [address] [place] [address]  Phone: [phone]  Fax: [phone]  Follow Up Time:

## 2019-02-08 NOTE — DISCHARGE NOTE OB - PLAN OF CARE
pp recovery advil 4 tabs every 8 hrs as needed, call with any problems, follow up at office in   2weeks

## 2019-02-08 NOTE — PROGRESS NOTE ADULT - SUBJECTIVE AND OBJECTIVE BOX
pt doing well  ICU Vital Signs Last 24 Hrs  T(C): 36.3 (08 Feb 2019 02:30), Max: 36.8 (07 Feb 2019 17:45)  T(F): 97.4 (08 Feb 2019 02:30), Max: 98.3 (07 Feb 2019 17:45)  HR: 81 (08 Feb 2019 02:30) (74 - 81)  BP: 130/85 (08 Feb 2019 02:30) (116/78 - 130/85)    RR: 18 (08 Feb 2019 02:30) (18 - 18)  SpO2: 100% (08 Feb 2019 02:30) (99% - 100%)  abdomen soft, nd  incision c/d/i  neg calf tenderness    pod 4 s/p c/s  for dc

## 2019-02-08 NOTE — DISCHARGE NOTE OB - CARE PLAN
Principal Discharge DX:	 delivery delivered  Goal:	pp recovery  Assessment and plan of treatment:	advil 4 tabs every 8 hrs as needed, call with any problems, follow up at office in   2weeks

## 2019-02-08 NOTE — PROGRESS NOTE ADULT - SUBJECTIVE AND OBJECTIVE BOX
Patient evaluated at bedside.   She reports pain is well controlled.  She denies headache, dizziness, chest pain, palpitations, shortness of breathe, nausea, vomiting or heavy vaginal bleeding.  She has been ambulating without assistance, voiding spontaneously, passing gas, tolerating regular diet and is breastfeeding.    Physical Exam:  Vital Signs Last 24 Hrs  T(C): 36.3 (08 Feb 2019 02:30), Max: 36.8 (07 Feb 2019 17:45)  T(F): 97.4 (08 Feb 2019 02:30), Max: 98.3 (07 Feb 2019 17:45)  HR: 81 (08 Feb 2019 02:30) (74 - 81)  BP: 130/85 (08 Feb 2019 02:30) (116/78 - 130/85)  BP(mean): --  RR: 18 (08 Feb 2019 02:30) (18 - 18)  SpO2: 100% (08 Feb 2019 02:30) (99% - 100%)      GA: NAD, A+0 x 3  CV: RRR  Pulm: Normal work of breathing  Breasts: soft, nontender, no palpable masses  Abd: + BS, soft, nontender, nondistended, no rebound or guarding, uterus firm at midline,  fundus below umbilicus  Incision: well approximated, no erythema or discharge  : lochia WNL  Extremities: no swelling or calf tenderness

## 2019-02-08 NOTE — PROGRESS NOTE ADULT - ASSESSMENT
33y Female POD#4   s/p C/S, Uncomplicated                                       1. Neuro/Pain:  OPM  2  CV:  VS per routine  3. Pulm: Encourage ISS & Ambulation  4. GI:  Reg  5. : Voiding  6. DVT ppx: SCDs, SQH 5000 mg BID  7. Dispo: POD #4

## 2019-02-12 DIAGNOSIS — Z3A.39 39 WEEKS GESTATION OF PREGNANCY: ICD-10-CM

## 2019-02-12 DIAGNOSIS — Z23 ENCOUNTER FOR IMMUNIZATION: ICD-10-CM

## 2019-02-22 PROBLEM — Z00.00 ENCOUNTER FOR PREVENTIVE HEALTH EXAMINATION: Status: ACTIVE | Noted: 2019-02-22

## 2019-12-02 ENCOUNTER — RESULT REVIEW (OUTPATIENT)
Age: 34
End: 2019-12-02

## 2020-07-01 NOTE — DISCHARGE NOTE OB - FOUL SMELLING VAGINAL DISCHARGE
How Severe Is Your Skin Lesion?: moderate
Has Your Skin Lesion Been Treated?: not been treated
Is This A New Presentation, Or A Follow-Up?: Growth
Statement Selected

## 2020-07-29 ENCOUNTER — EMERGENCY (EMERGENCY)
Facility: HOSPITAL | Age: 35
LOS: 1 days | Discharge: HOME | End: 2020-07-29
Admitting: EMERGENCY MEDICINE
Payer: COMMERCIAL

## 2020-07-29 VITALS
TEMPERATURE: 98 F | OXYGEN SATURATION: 100 % | DIASTOLIC BLOOD PRESSURE: 70 MMHG | SYSTOLIC BLOOD PRESSURE: 105 MMHG | HEART RATE: 58 BPM | RESPIRATION RATE: 16 BRPM

## 2020-07-29 VITALS
HEART RATE: 61 BPM | TEMPERATURE: 99 F | WEIGHT: 139.99 LBS | OXYGEN SATURATION: 98 % | SYSTOLIC BLOOD PRESSURE: 105 MMHG | DIASTOLIC BLOOD PRESSURE: 71 MMHG | RESPIRATION RATE: 18 BRPM

## 2020-07-29 DIAGNOSIS — M25.531 PAIN IN RIGHT WRIST: ICD-10-CM

## 2020-07-29 DIAGNOSIS — Z20.828 CONTACT WITH AND (SUSPECTED) EXPOSURE TO OTHER VIRAL COMMUNICABLE DISEASES: ICD-10-CM
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
## 2020-07-29 DIAGNOSIS — S52.501A UNSPECIFIED FRACTURE OF THE LOWER END OF RIGHT RADIUS, INITIAL ENCOUNTER FOR CLOSED FRACTURE: ICD-10-CM
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
## 2020-07-29 PROCEDURE — 99283 EMERGENCY DEPT VISIT LOW MDM: CPT | Mod: 57

## 2020-07-29 PROCEDURE — 73110 X-RAY EXAM OF WRIST: CPT | Mod: 26,RT

## 2020-07-29 PROCEDURE — 99284 EMERGENCY DEPT VISIT MOD MDM: CPT

## 2020-07-29 PROCEDURE — 73130 X-RAY EXAM OF HAND: CPT | Mod: 26,RT

## 2020-07-29 PROCEDURE — 25605 CLTX DST RDL FX/EPHYS SEP W/: CPT | Mod: RT

## 2020-07-29 RX ORDER — OXYCODONE AND ACETAMINOPHEN 5; 325 MG/1; MG/1
1 TABLET ORAL
Qty: 12 | Refills: 0
Start: 2020-07-29 | End: 2020-07-31

## 2020-07-29 RX ORDER — IBUPROFEN 200 MG
600 TABLET ORAL ONCE
Refills: 0 | Status: COMPLETED | OUTPATIENT
Start: 2020-07-29 | End: 2020-07-29

## 2020-07-29 RX ADMIN — Medication 600 MILLIGRAM(S): at 11:41

## 2020-07-29 NOTE — ED PROVIDER NOTE - CARE PROVIDERS DIRECT ADDRESSES
[email or web address]

## 2020-07-29 NOTE — ED PROVIDER NOTE - DIAGNOSTIC INTERPRETATION
xray wrist 3v: R comminuted distal radial fx with dorsal angulation.   xray hand 3v: R no fx no dislocation no soft tissue swelling

## 2020-07-29 NOTE — CONSULT NOTE ADULT - SUBJECTIVE AND OBJECTIVE BOX
CC:  Right wrist pain, swelling  HPI:  Otherwise healthy LHD 36 yo female presented to [facility] ER s/p fall while rollerblading.  Large dog jumped up into her chest causing FOOSH.  Immediate pain and swelling, inability to bear weight right wrist.  Dehnied numbness/tingling.    Sign history:  recent travel to [place], ROS + body aches    PMHx:  Reported prior fracture in arm as a child, not suire where / what bone  PSHx:  Reported thumb surgery as young child, unclear which side  SocHx:  Denies tobacco or alcohol use, no illicit drug use, pt works in [employer], licves at home in [place], young child (1.5 yrs)  Meds:  Celexa, Wellbutrin, history incomplete (pt does not know fully off hand)  All:  NKDA    PEx:  General:  see below  RUE specific:  FROM and nontender right shoulder and elbow, 5/5 strength  R wrist:  TTP and palpable deformity right dorsal wrist, severe swelling, apex volar alignment, no palpable crepitus, unable to tolerate any ROM.  NVI.  Exam of right thumb and digits within normal limits, FROM, nontender    Xrays:    R wrist xrays:  apex volar angulated distal radius fracture with doral comminution and 35 degrees angulation, mild loss of radial incliniaton, no SL widening, no clear intrarticular extention    Procedure:  After counseling pt elected for closed management as initial plan with close folowup.  Right wrist hemnatoma block placed with 7 ml 1% lidocaine, no epinephrine.  Good result.  Pt placed into weighted finger trap traction for stress relaxation.  After 5 minutes manual reduction performed.  Well paded 3 point mold sugar tonbg splint applied.  Neuro exam prior to and after reduction was stable and normal and pain improved.    Postreduction Xrays:  improved alignemnt, near anaomic, well molded splint    Labs:  COVID negative

## 2020-07-29 NOTE — ED PROVIDER NOTE - CLINICAL SUMMARY MEDICAL DECISION MAKING FREE TEXT BOX
s/p fall while roller blading on to outstretched R wrist with deformity. NVS intact will do xray give ibuprofen. patient also complaining of diarrhea, bodyaches and abdominal cramping, loose stools since return from a trip to [place] 4 days ago. offered work up with covid testing but patient is refusing at this time.

## 2020-07-29 NOTE — ED PROVIDER NOTE - PATIENT PORTAL LINK FT
You can access the FollowMyHealth Patient Portal offered by [facility] by registering at the following website: [email or web address]. By joining [employer]’s FollowMyHealth portal, you will also be able to view your health information using other applications (apps) compatible with our system.

## 2020-07-29 NOTE — ED PROVIDER NOTE - CARE PROVIDER_API CALL
Chilango Cevallos  [facility]  [address] [place] [address], [place] [address]  Phone: [phone]  Fax: [phone]  Follow Up Time: Chilango Cevallos  [facility] - ORTHOPEDICS  [address] [place] [address]  Phone: [phone]  Fax: [phone]  Follow Up Time:     Bart Knapp)  Orthopaedic Surgery  [address] [place] [address]  Phone: [phone]  Fax: [phone]  Follow Up Time:

## 2020-07-29 NOTE — ED PROVIDER NOTE - PROVIDER TOKENS
PROVIDER:[TOKEN:[[id number]]] PROVIDER:[TOKEN:[[id number]]],PROVIDER:[TOKEN:[[id number]]]

## 2020-07-29 NOTE — ED PROVIDER NOTE - PHYSICAL EXAMINATION
L wrist: edema over dorsum, TTP, skin intact  able to move all fingers. cap refill <2 sec  no snuff box tenderness

## 2020-07-29 NOTE — ED PROVIDER NOTE - PROGRESS NOTE DETAILS
offered COVID testing but is refusing at this time seen at bedside by ortho Dr Dennis after discussion with Dr Dennis and myself regarding need to follow up and outpatient protocols for screening, patient now agrees to COVID testing

## 2020-07-29 NOTE — CONSULT NOTE ADULT - ASSESSMENT
Right extrarticular distal radius fracture    Pt counseled regarding risks and benefits of operative and closed management given age, displacement, activity level and home requirements.  Pt elected for closed management, reduction performed as above, splint applied.      Followup in clinic in 1 week for conversion splint to cast and monitoring fluoro exam / xrays.

## 2020-07-29 NOTE — ED ADULT TRIAGE NOTE - CHIEF COMPLAINT QUOTE
Pt was roller blading and almost tripped on a dog landing on her right wrist/ hand    Also has diarrhea- traveled to [place] 4 days ago- does NOT want corona testing

## 2020-07-29 NOTE — ED PROVIDER NOTE - OBJECTIVE STATEMENT
PMHx hypothyroidism presents with R wrist pain after trip and fall over  a dog while rollerblading 1.5hrs ago. denies paresthesias or focal weakness. admits to episodes of bodyaches, stomach cramping and loose stools 2x/day. no blood, nausea, vomiting. able to tolerate POs. returned from [place] 4 days ago. denies URI sx, cough, SOB

## 2020-07-30 DIAGNOSIS — S52.551A OTHER EXTRAARTICULAR FRACTURE OF LOWER END OF RIGHT RADIUS, INITIAL ENCOUNTER FOR CLOSED FRACTURE: ICD-10-CM

## 2020-07-30 LAB — SARS-COV-2 RNA SPEC QL NAA+PROBE: SIGNIFICANT CHANGE UP

## 2020-08-26 NOTE — DISCHARGE NOTE OB - DISCHARGE DATE
08-Feb-2019 [facility] Pregnancy And Lactation Text: The risk during pregnancy and breastfeeding is uncertain with this medication.

## 2022-09-04 ENCOUNTER — NON-APPOINTMENT (OUTPATIENT)
Age: 37
End: 2022-09-04

## 2023-01-11 ENCOUNTER — APPOINTMENT (OUTPATIENT)
Age: 38
End: 2023-01-11
Payer: COMMERCIAL

## 2023-01-11 VITALS — BODY MASS INDEX: 28.34 KG/M2 | WEIGHT: 166 LBS | HEIGHT: 64 IN

## 2023-01-11 DIAGNOSIS — Z71.3 DIETARY COUNSELING AND SURVEILLANCE: ICD-10-CM

## 2023-01-11 PROCEDURE — 97802 MEDICAL NUTRITION INDIV IN: CPT | Mod: 95

## 2023-01-25 ENCOUNTER — TRANSCRIPTION ENCOUNTER (OUTPATIENT)
Age: 38
End: 2023-01-25

## 2023-02-06 ENCOUNTER — APPOINTMENT (OUTPATIENT)
Dept: ANTEPARTUM | Facility: CLINIC | Age: 38
End: 2023-02-06
Payer: COMMERCIAL

## 2023-02-06 ENCOUNTER — ASOB RESULT (OUTPATIENT)
Age: 38
End: 2023-02-06

## 2023-02-06 PROCEDURE — 76813 OB US NUCHAL MEAS 1 GEST: CPT

## 2023-02-06 PROCEDURE — 76801 OB US < 14 WKS SINGLE FETUS: CPT | Mod: 59

## 2023-02-08 ENCOUNTER — APPOINTMENT (OUTPATIENT)
Age: 38
End: 2023-02-08

## 2023-03-06 ENCOUNTER — APPOINTMENT (OUTPATIENT)
Dept: ANTEPARTUM | Facility: CLINIC | Age: 38
End: 2023-03-06
Payer: COMMERCIAL

## 2023-03-06 ENCOUNTER — ASOB RESULT (OUTPATIENT)
Age: 38
End: 2023-03-06

## 2023-03-06 PROCEDURE — 76811 OB US DETAILED SNGL FETUS: CPT

## 2023-03-17 ENCOUNTER — APPOINTMENT (OUTPATIENT)
Dept: ANTEPARTUM | Facility: CLINIC | Age: 38
End: 2023-03-17
Payer: COMMERCIAL

## 2023-03-17 ENCOUNTER — ASOB RESULT (OUTPATIENT)
Age: 38
End: 2023-03-17

## 2023-03-17 PROCEDURE — 59000 AMNIOCENTESIS DIAGNOSTIC: CPT

## 2023-03-17 PROCEDURE — 76946 ECHO GUIDE FOR AMNIOCENTESIS: CPT

## 2023-03-30 ENCOUNTER — ASOB RESULT (OUTPATIENT)
Age: 38
End: 2023-03-30

## 2023-03-30 ENCOUNTER — APPOINTMENT (OUTPATIENT)
Dept: ANTEPARTUM | Facility: CLINIC | Age: 38
End: 2023-03-30
Payer: COMMERCIAL

## 2023-03-30 PROCEDURE — 76816 OB US FOLLOW-UP PER FETUS: CPT

## 2023-04-10 ENCOUNTER — ASOB RESULT (OUTPATIENT)
Age: 38
End: 2023-04-10

## 2023-04-10 ENCOUNTER — APPOINTMENT (OUTPATIENT)
Dept: ANTEPARTUM | Facility: CLINIC | Age: 38
End: 2023-04-10
Payer: COMMERCIAL

## 2023-04-10 PROCEDURE — 76816 OB US FOLLOW-UP PER FETUS: CPT

## 2023-04-24 ENCOUNTER — APPOINTMENT (OUTPATIENT)
Dept: ANTEPARTUM | Facility: CLINIC | Age: 38
End: 2023-04-24

## 2023-05-01 ENCOUNTER — APPOINTMENT (OUTPATIENT)
Dept: ANTEPARTUM | Facility: CLINIC | Age: 38
End: 2023-05-01
Payer: COMMERCIAL

## 2023-05-01 ENCOUNTER — ASOB RESULT (OUTPATIENT)
Age: 38
End: 2023-05-01

## 2023-05-01 PROCEDURE — 76817 TRANSVAGINAL US OBSTETRIC: CPT

## 2023-05-01 PROCEDURE — 76816 OB US FOLLOW-UP PER FETUS: CPT

## 2023-05-22 ENCOUNTER — ASOB RESULT (OUTPATIENT)
Age: 38
End: 2023-05-22

## 2023-05-22 ENCOUNTER — APPOINTMENT (OUTPATIENT)
Dept: ANTEPARTUM | Facility: CLINIC | Age: 38
End: 2023-05-22
Payer: COMMERCIAL

## 2023-05-22 PROCEDURE — 76816 OB US FOLLOW-UP PER FETUS: CPT

## 2023-06-01 ENCOUNTER — APPOINTMENT (OUTPATIENT)
Dept: ANTEPARTUM | Facility: CLINIC | Age: 38
End: 2023-06-01
Payer: COMMERCIAL

## 2023-06-01 ENCOUNTER — ASOB RESULT (OUTPATIENT)
Age: 38
End: 2023-06-01

## 2023-06-01 PROCEDURE — 76816 OB US FOLLOW-UP PER FETUS: CPT

## 2023-06-09 ENCOUNTER — APPOINTMENT (OUTPATIENT)
Dept: ANTEPARTUM | Facility: CLINIC | Age: 38
End: 2023-06-09
Payer: COMMERCIAL

## 2023-06-09 ENCOUNTER — ASOB RESULT (OUTPATIENT)
Age: 38
End: 2023-06-09

## 2023-06-09 PROCEDURE — 76816 OB US FOLLOW-UP PER FETUS: CPT

## 2023-06-09 PROCEDURE — 76819 FETAL BIOPHYS PROFIL W/O NST: CPT

## 2023-06-21 ENCOUNTER — APPOINTMENT (OUTPATIENT)
Dept: ANTEPARTUM | Facility: CLINIC | Age: 38
End: 2023-06-21
Payer: COMMERCIAL

## 2023-06-21 ENCOUNTER — ASOB RESULT (OUTPATIENT)
Age: 38
End: 2023-06-21

## 2023-06-21 PROCEDURE — 76819 FETAL BIOPHYS PROFIL W/O NST: CPT

## 2023-06-21 PROCEDURE — 76816 OB US FOLLOW-UP PER FETUS: CPT

## 2023-07-06 ENCOUNTER — APPOINTMENT (OUTPATIENT)
Dept: ANTEPARTUM | Facility: CLINIC | Age: 38
End: 2023-07-06
Payer: COMMERCIAL

## 2023-07-06 ENCOUNTER — ASOB RESULT (OUTPATIENT)
Age: 38
End: 2023-07-06

## 2023-07-06 PROCEDURE — 76819 FETAL BIOPHYS PROFIL W/O NST: CPT

## 2023-07-06 PROCEDURE — 76816 OB US FOLLOW-UP PER FETUS: CPT

## 2023-07-10 ENCOUNTER — ASOB RESULT (OUTPATIENT)
Age: 38
End: 2023-07-10

## 2023-07-10 ENCOUNTER — APPOINTMENT (OUTPATIENT)
Dept: ANTEPARTUM | Facility: CLINIC | Age: 38
End: 2023-07-10
Payer: COMMERCIAL

## 2023-07-10 PROCEDURE — 76819 FETAL BIOPHYS PROFIL W/O NST: CPT

## 2023-07-18 ENCOUNTER — APPOINTMENT (OUTPATIENT)
Dept: ANTEPARTUM | Facility: CLINIC | Age: 38
End: 2023-07-18
Payer: COMMERCIAL

## 2023-07-18 ENCOUNTER — ASOB RESULT (OUTPATIENT)
Age: 38
End: 2023-07-18

## 2023-07-18 PROCEDURE — 76816 OB US FOLLOW-UP PER FETUS: CPT

## 2023-07-18 PROCEDURE — 76818 FETAL BIOPHYS PROFILE W/NST: CPT

## 2023-07-27 ENCOUNTER — ASOB RESULT (OUTPATIENT)
Age: 38
End: 2023-07-27

## 2023-07-27 ENCOUNTER — APPOINTMENT (OUTPATIENT)
Dept: ANTEPARTUM | Facility: CLINIC | Age: 38
End: 2023-07-27
Payer: COMMERCIAL

## 2023-07-27 PROCEDURE — 76818 FETAL BIOPHYS PROFILE W/NST: CPT

## 2023-07-31 ENCOUNTER — INPATIENT (INPATIENT)
Facility: HOSPITAL | Age: 38
LOS: 3 days | Discharge: HOME | End: 2023-08-04
Attending: OBSTETRICS & GYNECOLOGY | Admitting: OBSTETRICS & GYNECOLOGY
Payer: COMMERCIAL

## 2023-07-31 ENCOUNTER — TRANSCRIPTION ENCOUNTER (OUTPATIENT)
Age: 38
End: 2023-07-31

## 2023-07-31 VITALS — WEIGHT: 220.02 LBS | HEIGHT: 64 IN

## 2023-07-31 DIAGNOSIS — Z3A.00 WEEKS OF GESTATION OF PREGNANCY NOT SPECIFIED: ICD-10-CM

## 2023-07-31 DIAGNOSIS — O26.899 OTHER SPECIFIED PREGNANCY RELATED CONDITIONS, UNSPECIFIED TRIMESTER: ICD-10-CM

## 2023-07-31 LAB
ALBUMIN SERPL ELPH-MCNC: 3.6 G/DL — SIGNIFICANT CHANGE UP (ref 3.3–5)
ALP SERPL-CCNC: 100 U/L — SIGNIFICANT CHANGE UP (ref 40–120)
ALT FLD-CCNC: 16 U/L — SIGNIFICANT CHANGE UP (ref 10–45)
ANION GAP SERPL CALC-SCNC: 14 MMOL/L — SIGNIFICANT CHANGE UP (ref 5–17)
APTT BLD: 30.2 SEC — SIGNIFICANT CHANGE UP (ref 24.5–35.6)
AST SERPL-CCNC: 23 U/L — SIGNIFICANT CHANGE UP (ref 10–40)
BASOPHILS # BLD AUTO: 0.04 K/UL — SIGNIFICANT CHANGE UP (ref 0–0.2)
BASOPHILS NFR BLD AUTO: 0.3 % — SIGNIFICANT CHANGE UP (ref 0–2)
BILIRUB SERPL-MCNC: 0.2 MG/DL — SIGNIFICANT CHANGE UP (ref 0.2–1.2)
BLD GP AB SCN SERPL QL: NEGATIVE — SIGNIFICANT CHANGE UP
BUN SERPL-MCNC: 11 MG/DL — SIGNIFICANT CHANGE UP (ref 7–23)
CALCIUM SERPL-MCNC: 9 MG/DL — SIGNIFICANT CHANGE UP (ref 8.4–10.5)
CHLORIDE SERPL-SCNC: 103 MMOL/L — SIGNIFICANT CHANGE UP (ref 96–108)
CO2 SERPL-SCNC: 19 MMOL/L — LOW (ref 22–31)
CREAT ?TM UR-MCNC: 73 MG/DL — SIGNIFICANT CHANGE UP
CREAT SERPL-MCNC: 0.37 MG/DL — LOW (ref 0.5–1.3)
EGFR: 133 ML/MIN/1.73M2 — SIGNIFICANT CHANGE UP
EOSINOPHIL # BLD AUTO: 0.1 K/UL — SIGNIFICANT CHANGE UP (ref 0–0.5)
EOSINOPHIL NFR BLD AUTO: 0.7 % — SIGNIFICANT CHANGE UP (ref 0–6)
FIBRINOGEN PPP-MCNC: 595 MG/DL — HIGH (ref 200–445)
GLUCOSE SERPL-MCNC: 82 MG/DL — SIGNIFICANT CHANGE UP (ref 70–99)
HCT VFR BLD CALC: 39.1 % — SIGNIFICANT CHANGE UP (ref 34.5–45)
HGB BLD-MCNC: 13.1 G/DL — SIGNIFICANT CHANGE UP (ref 11.5–15.5)
IMM GRANULOCYTES NFR BLD AUTO: 1.3 % — HIGH (ref 0–0.9)
INR BLD: 0.86 — SIGNIFICANT CHANGE UP (ref 0.85–1.18)
LDH SERPL L TO P-CCNC: 224 U/L — SIGNIFICANT CHANGE UP (ref 50–242)
LYMPHOCYTES # BLD AUTO: 1.9 K/UL — SIGNIFICANT CHANGE UP (ref 1–3.3)
LYMPHOCYTES # BLD AUTO: 14 % — SIGNIFICANT CHANGE UP (ref 13–44)
MCHC RBC-ENTMCNC: 29.2 PG — SIGNIFICANT CHANGE UP (ref 27–34)
MCHC RBC-ENTMCNC: 33.5 GM/DL — SIGNIFICANT CHANGE UP (ref 32–36)
MCV RBC AUTO: 87.3 FL — SIGNIFICANT CHANGE UP (ref 80–100)
MONOCYTES # BLD AUTO: 0.92 K/UL — HIGH (ref 0–0.9)
MONOCYTES NFR BLD AUTO: 6.8 % — SIGNIFICANT CHANGE UP (ref 2–14)
NEUTROPHILS # BLD AUTO: 10.42 K/UL — HIGH (ref 1.8–7.4)
NEUTROPHILS NFR BLD AUTO: 76.9 % — SIGNIFICANT CHANGE UP (ref 43–77)
NRBC # BLD: 0 /100 WBCS — SIGNIFICANT CHANGE UP (ref 0–0)
PLATELET # BLD AUTO: 264 K/UL — SIGNIFICANT CHANGE UP (ref 150–400)
POTASSIUM SERPL-MCNC: 4.3 MMOL/L — SIGNIFICANT CHANGE UP (ref 3.5–5.3)
POTASSIUM SERPL-SCNC: 4.3 MMOL/L — SIGNIFICANT CHANGE UP (ref 3.5–5.3)
PROT ?TM UR-MCNC: 18 MG/DL — HIGH (ref 0–12)
PROT SERPL-MCNC: 7.2 G/DL — SIGNIFICANT CHANGE UP (ref 6–8.3)
PROT/CREAT UR-RTO: 0.2 RATIO — SIGNIFICANT CHANGE UP (ref 0–0.2)
PROTHROM AB SERPL-ACNC: 9.9 SEC — SIGNIFICANT CHANGE UP (ref 9.5–13)
RBC # BLD: 4.48 M/UL — SIGNIFICANT CHANGE UP (ref 3.8–5.2)
RBC # FLD: 14.6 % — HIGH (ref 10.3–14.5)
RH IG SCN BLD-IMP: POSITIVE — SIGNIFICANT CHANGE UP
RH IG SCN BLD-IMP: POSITIVE — SIGNIFICANT CHANGE UP
SODIUM SERPL-SCNC: 136 MMOL/L — SIGNIFICANT CHANGE UP (ref 135–145)
URATE SERPL-MCNC: 4.7 MG/DL — SIGNIFICANT CHANGE UP (ref 2.5–7)
WBC # BLD: 13.56 K/UL — HIGH (ref 3.8–10.5)
WBC # FLD AUTO: 13.56 K/UL — HIGH (ref 3.8–10.5)

## 2023-07-31 RX ORDER — FAMOTIDINE 10 MG/ML
20 INJECTION INTRAVENOUS ONCE
Refills: 0 | Status: COMPLETED | OUTPATIENT
Start: 2023-07-31 | End: 2023-07-31

## 2023-07-31 RX ORDER — CEFAZOLIN SODIUM 1 G
2000 VIAL (EA) INJECTION ONCE
Refills: 0 | Status: COMPLETED | OUTPATIENT
Start: 2023-07-31 | End: 2023-07-31

## 2023-07-31 RX ORDER — CITRIC ACID/SODIUM CITRATE 300-500 MG
30 SOLUTION, ORAL ORAL ONCE
Refills: 0 | Status: COMPLETED | OUTPATIENT
Start: 2023-07-31 | End: 2023-07-31

## 2023-07-31 RX ORDER — SODIUM CHLORIDE 9 MG/ML
1000 INJECTION, SOLUTION INTRAVENOUS ONCE
Refills: 0 | Status: COMPLETED | OUTPATIENT
Start: 2023-07-31 | End: 2023-07-31

## 2023-07-31 RX ORDER — OXYTOCIN 10 UNIT/ML
333.33 VIAL (ML) INJECTION
Qty: 20 | Refills: 0 | Status: DISCONTINUED | OUTPATIENT
Start: 2023-07-31 | End: 2023-08-01

## 2023-07-31 RX ORDER — SODIUM CHLORIDE 9 MG/ML
1000 INJECTION, SOLUTION INTRAVENOUS
Refills: 0 | Status: DISCONTINUED | OUTPATIENT
Start: 2023-07-31 | End: 2023-08-01

## 2023-07-31 RX ADMIN — SODIUM CHLORIDE 125 MILLILITER(S): 9 INJECTION, SOLUTION INTRAVENOUS at 23:22

## 2023-07-31 RX ADMIN — Medication 100 MILLIGRAM(S): at 23:35

## 2023-07-31 RX ADMIN — SODIUM CHLORIDE 2000 MILLILITER(S): 9 INJECTION, SOLUTION INTRAVENOUS at 23:21

## 2023-07-31 RX ADMIN — Medication 30 MILLILITER(S): at 23:22

## 2023-07-31 RX ADMIN — FAMOTIDINE 20 MILLIGRAM(S): 10 INJECTION INTRAVENOUS at 23:21

## 2023-07-31 NOTE — PRE-ANESTHESIA EVALUATION ADULT - NSANTHDIETYNSD_GEN_ALL_CORE
Letter mailed to patient reminding her she has overdue orders.     Lab Frequency Next Occurrence   US BREAST RIGHT COMPLETE (BPM=08569) Once 06/02/2022
Yes

## 2023-07-31 NOTE — PRE-ANESTHESIA EVALUATION ADULT - NSANTHPMHFT_GEN_ALL_CORE
Cardiac: Positive for Gestational Hypertension. Denies HLD, MI/Angina/Heart Failure, Arrhythmia, Murmur/Valvular Disorder. >4 METS  Pulmonary: Denies Asthma, COPD, BRIANA  Renal: Denies kidney dysfunction  Hepatic: Denies liver dysfunction  Gastrointestinal: Positive for GERD symptoms during pregnancy. Denies IBS  Endocrine: Positive for hypothyroidism. Denies DM.  Neurologic: Denies stroke/seizure disorder  Psychiatric: Positive for bipolar disorder and anxiety/depression  Hematologic: Denies anemia, blood clotting disorder, blood thinning medication use aside from ASA 81 mg PO q Day, last taken 8 days ago, last taken on 2023    PSH:  section for non-reassuring fetal heart rate .

## 2023-07-31 NOTE — PATIENT PROFILE OB - FALL HARM RISK - UNIVERSAL INTERVENTIONS
Bed in lowest position, wheels locked, appropriate side rails in place/Call bell, personal items and telephone in reach/Instruct patient to call for assistance before getting out of bed or chair/Non-slip footwear when patient is out of bed/[place] to call system/Physically safe environment - no spills, clutter or unnecessary equipment/Purposeful Proactive Rounding/Room/bathroom lighting operational, light cord in reach

## 2023-08-01 LAB
COVID-19 SPIKE DOMAIN AB INTERP: POSITIVE
COVID-19 SPIKE DOMAIN ANTIBODY RESULT: 11.6 U/ML — HIGH
SARS-COV-2 IGG+IGM SERPL QL IA: 11.6 U/ML — HIGH
SARS-COV-2 IGG+IGM SERPL QL IA: POSITIVE
T PALLIDUM AB TITR SER: NEGATIVE — SIGNIFICANT CHANGE UP

## 2023-08-01 PROCEDURE — 88307 TISSUE EXAM BY PATHOLOGIST: CPT | Mod: 26

## 2023-08-01 RX ORDER — IBUPROFEN 200 MG
600 TABLET ORAL EVERY 6 HOURS
Refills: 0 | Status: COMPLETED | OUTPATIENT
Start: 2023-08-01 | End: 2024-06-29

## 2023-08-01 RX ORDER — KETOROLAC TROMETHAMINE 30 MG/ML
30 SYRINGE (ML) INJECTION EVERY 6 HOURS
Refills: 0 | Status: DISCONTINUED | OUTPATIENT
Start: 2023-08-01 | End: 2023-08-02

## 2023-08-01 RX ORDER — ACETAMINOPHEN 500 MG
975 TABLET ORAL
Refills: 0 | Status: DISCONTINUED | OUTPATIENT
Start: 2023-08-01 | End: 2023-08-04

## 2023-08-01 RX ORDER — LEVOTHYROXINE SODIUM 125 MCG
75 TABLET ORAL DAILY
Refills: 0 | Status: DISCONTINUED | OUTPATIENT
Start: 2023-08-01 | End: 2023-08-04

## 2023-08-01 RX ORDER — OXYTOCIN 10 UNIT/ML
333.33 VIAL (ML) INJECTION
Qty: 20 | Refills: 0 | Status: DISCONTINUED | OUTPATIENT
Start: 2023-08-01 | End: 2023-08-04

## 2023-08-01 RX ORDER — LAMOTRIGINE 25 MG/1
200 TABLET, ORALLY DISINTEGRATING ORAL DAILY
Refills: 0 | Status: DISCONTINUED | OUTPATIENT
Start: 2023-08-01 | End: 2023-08-04

## 2023-08-01 RX ORDER — SIMETHICONE 80 MG/1
80 TABLET, CHEWABLE ORAL EVERY 4 HOURS
Refills: 0 | Status: DISCONTINUED | OUTPATIENT
Start: 2023-08-01 | End: 2023-08-04

## 2023-08-01 RX ORDER — OXYCODONE HYDROCHLORIDE 5 MG/1
5 TABLET ORAL
Refills: 0 | Status: COMPLETED | OUTPATIENT
Start: 2023-08-01 | End: 2023-08-08

## 2023-08-01 RX ORDER — SODIUM CHLORIDE 9 MG/ML
1000 INJECTION, SOLUTION INTRAVENOUS
Refills: 0 | Status: DISCONTINUED | OUTPATIENT
Start: 2023-08-01 | End: 2023-08-04

## 2023-08-01 RX ORDER — LAMOTRIGINE 25 MG/1
100 TABLET, ORALLY DISINTEGRATING ORAL DAILY
Refills: 0 | Status: DISCONTINUED | OUTPATIENT
Start: 2023-08-01 | End: 2023-08-04

## 2023-08-01 RX ORDER — TETANUS TOXOID, REDUCED DIPHTHERIA TOXOID AND ACELLULAR PERTUSSIS VACCINE, ADSORBED 5; 2.5; 8; 8; 2.5 [IU]/.5ML; [IU]/.5ML; UG/.5ML; UG/.5ML; UG/.5ML
0.5 SUSPENSION INTRAMUSCULAR ONCE
Refills: 0 | Status: DISCONTINUED | OUTPATIENT
Start: 2023-08-01 | End: 2023-08-04

## 2023-08-01 RX ORDER — MAGNESIUM HYDROXIDE 400 MG/1
30 TABLET, CHEWABLE ORAL
Refills: 0 | Status: DISCONTINUED | OUTPATIENT
Start: 2023-08-01 | End: 2023-08-04

## 2023-08-01 RX ORDER — OXYCODONE HYDROCHLORIDE 5 MG/1
5 TABLET ORAL ONCE
Refills: 0 | Status: DISCONTINUED | OUTPATIENT
Start: 2023-08-01 | End: 2023-08-04

## 2023-08-01 RX ORDER — ENOXAPARIN SODIUM 100 MG/ML
40 INJECTION SUBCUTANEOUS EVERY 24 HOURS
Refills: 0 | Status: DISCONTINUED | OUTPATIENT
Start: 2023-08-01 | End: 2023-08-04

## 2023-08-01 RX ORDER — DIPHENHYDRAMINE HCL 50 MG
25 CAPSULE ORAL EVERY 6 HOURS
Refills: 0 | Status: DISCONTINUED | OUTPATIENT
Start: 2023-08-01 | End: 2023-08-04

## 2023-08-01 RX ORDER — LANOLIN
1 OINTMENT (GRAM) TOPICAL EVERY 6 HOURS
Refills: 0 | Status: DISCONTINUED | OUTPATIENT
Start: 2023-08-01 | End: 2023-08-04

## 2023-08-01 RX ADMIN — Medication 30 MILLIGRAM(S): at 03:17

## 2023-08-01 RX ADMIN — SIMETHICONE 80 MILLIGRAM(S): 80 TABLET, CHEWABLE ORAL at 18:34

## 2023-08-01 RX ADMIN — Medication 975 MILLIGRAM(S): at 05:51

## 2023-08-01 RX ADMIN — Medication 975 MILLIGRAM(S): at 18:34

## 2023-08-01 RX ADMIN — Medication 30 MILLIGRAM(S): at 20:46

## 2023-08-01 RX ADMIN — Medication 975 MILLIGRAM(S): at 12:16

## 2023-08-01 RX ADMIN — Medication 30 MILLIGRAM(S): at 21:30

## 2023-08-01 RX ADMIN — Medication 30 MILLIGRAM(S): at 03:33

## 2023-08-01 RX ADMIN — ENOXAPARIN SODIUM 40 MILLIGRAM(S): 100 INJECTION SUBCUTANEOUS at 18:30

## 2023-08-01 RX ADMIN — LAMOTRIGINE 200 MILLIGRAM(S): 25 TABLET, ORALLY DISINTEGRATING ORAL at 20:02

## 2023-08-01 RX ADMIN — SIMETHICONE 80 MILLIGRAM(S): 80 TABLET, CHEWABLE ORAL at 12:15

## 2023-08-01 RX ADMIN — LAMOTRIGINE 100 MILLIGRAM(S): 25 TABLET, ORALLY DISINTEGRATING ORAL at 20:02

## 2023-08-01 RX ADMIN — Medication 30 MILLIGRAM(S): at 14:58

## 2023-08-01 NOTE — LACTATION INITIAL EVALUATION - LACTATION INTERVENTIONS
initiate/review safe skin-to-skin/initiate/review hand expression/initiate/review pumping guidelines and safe milk handling/initiate/review techniques for position and latch/post discharge community resources provided/initiate/review supplementation plan due to medical indications/review techniques to increase milk supply/reviewed components of an effective feeding and at least 8 effective feedings per day required/reviewed importance of monitoring infant diapers, the breastfeeding log, and minimum output each day/reviewed risks of unnecessary formula supplementation/reviewed risks of artificial nipples/reviewed strategies to transition to breastfeeding only/reviewed feeding on demand/by cue at least 8 times a day/reviewed indications of inadequate milk transfer that would require supplementation

## 2023-08-01 NOTE — LACTATION INITIAL EVALUATION - NS LACT CON REASON FOR REQ
23-Jun-2020 Visit with dyad twice. Baby very sleepy, no latch. Discussed norms in first 24 hours, reassured mom that baby being sleepy now does not predict the breastfeeding journey. We discussed her previous journey, she reports she did not make enough milk and is concerned she isn't now. With her first baby, she combo fed but did not pump for extra stimulation, we discussed that and I encouraged her to do so if baby received a bottle, as well as HE after feeds. Discussed HE, finger/syringe feeding baby, frequent skin to skin and responsive feeds. All questions/concerns addressed. Primary RN updated./multiparous mom

## 2023-08-02 LAB
BASOPHILS # BLD AUTO: 0.04 K/UL — SIGNIFICANT CHANGE UP (ref 0–0.2)
BASOPHILS NFR BLD AUTO: 0.3 % — SIGNIFICANT CHANGE UP (ref 0–2)
EOSINOPHIL # BLD AUTO: 0.02 K/UL — SIGNIFICANT CHANGE UP (ref 0–0.5)
EOSINOPHIL NFR BLD AUTO: 0.2 % — SIGNIFICANT CHANGE UP (ref 0–6)
HCT VFR BLD CALC: 30.3 % — LOW (ref 34.5–45)
HGB BLD-MCNC: 9.8 G/DL — LOW (ref 11.5–15.5)
IMM GRANULOCYTES NFR BLD AUTO: 1.3 % — HIGH (ref 0–0.9)
LYMPHOCYTES # BLD AUTO: 1.3 K/UL — SIGNIFICANT CHANGE UP (ref 1–3.3)
LYMPHOCYTES # BLD AUTO: 10.5 % — LOW (ref 13–44)
MCHC RBC-ENTMCNC: 29.1 PG — SIGNIFICANT CHANGE UP (ref 27–34)
MCHC RBC-ENTMCNC: 32.3 GM/DL — SIGNIFICANT CHANGE UP (ref 32–36)
MCV RBC AUTO: 89.9 FL — SIGNIFICANT CHANGE UP (ref 80–100)
MONOCYTES # BLD AUTO: 0.87 K/UL — SIGNIFICANT CHANGE UP (ref 0–0.9)
MONOCYTES NFR BLD AUTO: 7 % — SIGNIFICANT CHANGE UP (ref 2–14)
NEUTROPHILS # BLD AUTO: 10 K/UL — HIGH (ref 1.8–7.4)
NEUTROPHILS NFR BLD AUTO: 80.7 % — HIGH (ref 43–77)
NRBC # BLD: 0 /100 WBCS — SIGNIFICANT CHANGE UP (ref 0–0)
PLATELET # BLD AUTO: 200 K/UL — SIGNIFICANT CHANGE UP (ref 150–400)
RBC # BLD: 3.37 M/UL — LOW (ref 3.8–5.2)
RBC # FLD: 14.6 % — HIGH (ref 10.3–14.5)
WBC # BLD: 12.39 K/UL — HIGH (ref 3.8–10.5)
WBC # FLD AUTO: 12.39 K/UL — HIGH (ref 3.8–10.5)

## 2023-08-02 RX ORDER — IBUPROFEN 200 MG
600 TABLET ORAL EVERY 6 HOURS
Refills: 0 | Status: DISCONTINUED | OUTPATIENT
Start: 2023-08-02 | End: 2023-08-04

## 2023-08-02 RX ORDER — OXYCODONE HYDROCHLORIDE 5 MG/1
5 TABLET ORAL
Refills: 0 | Status: DISCONTINUED | OUTPATIENT
Start: 2023-08-02 | End: 2023-08-04

## 2023-08-02 RX ADMIN — Medication 600 MILLIGRAM(S): at 11:42

## 2023-08-02 RX ADMIN — Medication 975 MILLIGRAM(S): at 15:03

## 2023-08-02 RX ADMIN — Medication 600 MILLIGRAM(S): at 17:43

## 2023-08-02 RX ADMIN — SIMETHICONE 80 MILLIGRAM(S): 80 TABLET, CHEWABLE ORAL at 09:32

## 2023-08-02 RX ADMIN — ENOXAPARIN SODIUM 40 MILLIGRAM(S): 100 INJECTION SUBCUTANEOUS at 15:03

## 2023-08-02 RX ADMIN — SIMETHICONE 80 MILLIGRAM(S): 80 TABLET, CHEWABLE ORAL at 05:10

## 2023-08-02 RX ADMIN — Medication 75 MICROGRAM(S): at 06:07

## 2023-08-02 RX ADMIN — Medication 975 MILLIGRAM(S): at 20:51

## 2023-08-02 RX ADMIN — Medication 975 MILLIGRAM(S): at 21:21

## 2023-08-02 RX ADMIN — Medication 975 MILLIGRAM(S): at 06:07

## 2023-08-02 RX ADMIN — LAMOTRIGINE 200 MILLIGRAM(S): 25 TABLET, ORALLY DISINTEGRATING ORAL at 11:41

## 2023-08-02 RX ADMIN — LAMOTRIGINE 100 MILLIGRAM(S): 25 TABLET, ORALLY DISINTEGRATING ORAL at 11:41

## 2023-08-02 RX ADMIN — Medication 30 MILLIGRAM(S): at 04:29

## 2023-08-02 RX ADMIN — SIMETHICONE 80 MILLIGRAM(S): 80 TABLET, CHEWABLE ORAL at 01:03

## 2023-08-02 NOTE — PROGRESS NOTE ADULT - ASSESSMENT
A/P   38y  s/p  section, c/b GHTN POD #1, stable  - gHTN: normotensive, no toxic complaints  -  Pain: PO motrin q6h, Tylenol q6h, oxycodone for severe pain PRN  -  Post-operatively labs: post-op Hgb  -  GI: tolerating clears, passing gas, ADAT  -  : s/p hill, f/u TOV  -  DVT prophylaxis: ambulation, SCDs, Lovenox  -  Dispo: POD 3 or 4

## 2023-08-03 ENCOUNTER — APPOINTMENT (OUTPATIENT)
Dept: ANTEPARTUM | Facility: CLINIC | Age: 38
End: 2023-08-03

## 2023-08-03 ENCOUNTER — TRANSCRIPTION ENCOUNTER (OUTPATIENT)
Age: 38
End: 2023-08-03

## 2023-08-03 RX ORDER — LAMOTRIGINE 25 MG/1
1 TABLET, ORALLY DISINTEGRATING ORAL
Qty: 0 | Refills: 0 | DISCHARGE
Start: 2023-08-03

## 2023-08-03 RX ORDER — LEVOTHYROXINE SODIUM 125 MCG
1 TABLET ORAL
Qty: 0 | Refills: 0 | DISCHARGE
Start: 2023-08-03

## 2023-08-03 RX ORDER — ACETAMINOPHEN 500 MG
3 TABLET ORAL
Qty: 0 | Refills: 0 | DISCHARGE
Start: 2023-08-03

## 2023-08-03 RX ORDER — IBUPROFEN 200 MG
1 TABLET ORAL
Qty: 0 | Refills: 0 | DISCHARGE
Start: 2023-08-03

## 2023-08-03 RX ADMIN — Medication 600 MILLIGRAM(S): at 23:52

## 2023-08-03 RX ADMIN — Medication 975 MILLIGRAM(S): at 15:15

## 2023-08-03 RX ADMIN — Medication 600 MILLIGRAM(S): at 12:01

## 2023-08-03 RX ADMIN — Medication 975 MILLIGRAM(S): at 20:27

## 2023-08-03 RX ADMIN — LAMOTRIGINE 200 MILLIGRAM(S): 25 TABLET, ORALLY DISINTEGRATING ORAL at 12:03

## 2023-08-03 RX ADMIN — Medication 600 MILLIGRAM(S): at 00:42

## 2023-08-03 RX ADMIN — Medication 600 MILLIGRAM(S): at 00:12

## 2023-08-03 RX ADMIN — Medication 75 MICROGRAM(S): at 05:51

## 2023-08-03 RX ADMIN — ENOXAPARIN SODIUM 40 MILLIGRAM(S): 100 INJECTION SUBCUTANEOUS at 15:14

## 2023-08-03 RX ADMIN — LAMOTRIGINE 100 MILLIGRAM(S): 25 TABLET, ORALLY DISINTEGRATING ORAL at 12:02

## 2023-08-03 RX ADMIN — MAGNESIUM HYDROXIDE 30 MILLILITER(S): 400 TABLET, CHEWABLE ORAL at 09:08

## 2023-08-03 RX ADMIN — Medication 600 MILLIGRAM(S): at 06:15

## 2023-08-03 RX ADMIN — Medication 600 MILLIGRAM(S): at 18:09

## 2023-08-03 RX ADMIN — Medication 975 MILLIGRAM(S): at 09:09

## 2023-08-03 RX ADMIN — Medication 600 MILLIGRAM(S): at 06:45

## 2023-08-03 NOTE — PROGRESS NOTE ADULT - ASSESSMENT
A/P: 38y s/p  section c/b gHTN, POD#2, stable  - gHTN: Normal to mild range BPs, no medications  -  Pain: PO motrin and Tylenol, oxycodone for severe pain PRN  -  GI: tolerating regular diet, passing gas  -  : s/p hill , urinating without difficulty  -  DVT prophylaxis: encouraged increased ambulation, SCDs, SQL  -  Dispo: POD 3 or 4

## 2023-08-03 NOTE — DISCHARGE NOTE OB - CARE PROVIDER_API CALL
Laura Gallagher  Obstetrics and Gynecology  [address] [place] [address]  Phone: [phone]  Fax: [phone]  Established Patient  Follow Up Time:

## 2023-08-03 NOTE — DISCHARGE NOTE OB - PATIENT PORTAL LINK FT
You can access the FollowMyHealth Patient Portal offered by [facility] by registering at the following website: [email or web address]. By joining [employer]’s FollowMyHealth portal, you will also be able to view your health information using other applications (apps) compatible with our system.

## 2023-08-03 NOTE — DISCHARGE NOTE OB - CARE PLAN
1 Principal Discharge DX:	Pregnancy, delivered  Assessment and plan of treatment:	Take Motrin 600mg every 6 hours and/or tylenol 650mg every 6 hours as needed for pain. Call your OB to schedule a follow up appointment in 6 weeks. Nothing per vagina until cleared by your OB - no intercourse, douching, tampons, etc.  Call your OB if you experience severe abdominal pain not improved by oral pain medications, heavy bright red vaginal bleeding saturating more than 1 pad per hour, or fever greater than 100.4F. Consider contraception options to be discussed with your OB.  Secondary Diagnosis:	Gestational hypertension  Assessment and plan of treatment:	Follow up with your OB in one week for a Bloodpressure check.  Purchase electronic blood pressure cuff at your local pharmacy. Check blood pressure 3x a day. If bp >160/110, you develop a headache not relieved by tylenol, visual disturbances, or right upper abdominal pain, call your doctor or the hospital, or go to your nearest emergency room. Regular diet, normal activity, nothing in the vagina for 6 weeks--no sex, tampons, tub baths, or swimming pools.

## 2023-08-03 NOTE — DISCHARGE NOTE OB - HOSPITAL COURSE
Patient is status post a vaginal delivery complicated by gestational hypertension. She had an uncomplicated postpartum course and has met her postpartum milestones appropriately.  Vitals are stable for discharge.

## 2023-08-03 NOTE — DISCHARGE NOTE OB - MEDICATION SUMMARY - MEDICATIONS TO TAKE
I will START or STAY ON the medications listed below when I get home from the hospital:    ibuprofen 600 mg oral tablet  -- 1 tab(s) by mouth every 6 hours  -- Indication: For pain    acetaminophen 325 mg oral tablet  -- 3 tab(s) by mouth every 8 hours  -- Indication: For pain    lamoTRIgine 200 mg oral tablet  -- 1 tab(s) by mouth once a day  -- Indication: For home med    lamoTRIgine 100 mg oral tablet  -- 1 tab(s) by mouth once a day  -- Indication: For home med    levothyroxine 75 mcg (0.075 mg) oral tablet  -- 1 tab(s) by mouth once a day  -- Indication: For home med

## 2023-08-03 NOTE — PROGRESS NOTE ADULT - ASSESSMENT
A/P: 38y s/p  section, POD#2, stable  - GHTN: normotensive, no toxic complaints  -  Pain: PO motrin q6hrs, tylenol q8hrs, oxycodone for severe pain PRN  -  Post-operatively labs: post-op Hgb , hemodynamically stable, no symptoms of anemia   -  GI: tolerating regular diet, passing gas  -  : s/p hill , urinating without difficulty  -  DVT prophylaxis: encouraged increased ambulation, SCDs, Lovenox  -  Dispo: POD 3 or 4

## 2023-08-03 NOTE — DISCHARGE NOTE OB - MEDICATION SUMMARY - MEDICATIONS TO STOP TAKING
I will STOP taking the medications listed below when I get home from the hospital:    oxycodone-acetaminophen 5 mg-325 mg oral tablet  -- 1 tab(s) by mouth every 6 hours MDD:4  -- Caution federal law prohibits the transfer of this drug to any person other  than the person for whom it was prescribed.  May cause drowsiness.  Alcohol may intensify this effect.  Use care when operating dangerous machinery.  This prescription cannot be refilled.  This product contains acetaminophen.  Do not use  with any other product containing acetaminophen to prevent possible liver damage.  Using more of this medication than prescribed may cause serious breathing problems.

## 2023-08-04 VITALS
OXYGEN SATURATION: 98 % | RESPIRATION RATE: 18 BRPM | DIASTOLIC BLOOD PRESSURE: 78 MMHG | TEMPERATURE: 98 F | SYSTOLIC BLOOD PRESSURE: 116 MMHG | HEART RATE: 87 BPM

## 2023-08-04 PROCEDURE — 85730 THROMBOPLASTIN TIME PARTIAL: CPT

## 2023-08-04 PROCEDURE — 85610 PROTHROMBIN TIME: CPT

## 2023-08-04 PROCEDURE — 85025 COMPLETE CBC W/AUTO DIFF WBC: CPT

## 2023-08-04 PROCEDURE — 86850 RBC ANTIBODY SCREEN: CPT

## 2023-08-04 PROCEDURE — 99214 OFFICE O/P EST MOD 30 MIN: CPT

## 2023-08-04 PROCEDURE — 86901 BLOOD TYPING SEROLOGIC RH(D): CPT

## 2023-08-04 PROCEDURE — 36415 COLL VENOUS BLD VENIPUNCTURE: CPT

## 2023-08-04 PROCEDURE — 82570 ASSAY OF URINE CREATININE: CPT

## 2023-08-04 PROCEDURE — 84156 ASSAY OF PROTEIN URINE: CPT

## 2023-08-04 PROCEDURE — 86769 SARS-COV-2 COVID-19 ANTIBODY: CPT

## 2023-08-04 PROCEDURE — 85384 FIBRINOGEN ACTIVITY: CPT

## 2023-08-04 PROCEDURE — 86780 TREPONEMA PALLIDUM: CPT

## 2023-08-04 PROCEDURE — 84550 ASSAY OF BLOOD/URIC ACID: CPT

## 2023-08-04 PROCEDURE — 86900 BLOOD TYPING SEROLOGIC ABO: CPT

## 2023-08-04 PROCEDURE — 83615 LACTATE (LD) (LDH) ENZYME: CPT

## 2023-08-04 PROCEDURE — 80053 COMPREHEN METABOLIC PANEL: CPT

## 2023-08-04 PROCEDURE — 59050 FETAL MONITOR W/REPORT: CPT

## 2023-08-04 PROCEDURE — 88307 TISSUE EXAM BY PATHOLOGIST: CPT

## 2023-08-04 RX ADMIN — Medication 600 MILLIGRAM(S): at 12:17

## 2023-08-04 RX ADMIN — Medication 600 MILLIGRAM(S): at 06:35

## 2023-08-04 RX ADMIN — Medication 975 MILLIGRAM(S): at 03:30

## 2023-08-04 RX ADMIN — Medication 75 MICROGRAM(S): at 05:53

## 2023-08-04 RX ADMIN — Medication 975 MILLIGRAM(S): at 09:45

## 2023-08-04 RX ADMIN — LAMOTRIGINE 100 MILLIGRAM(S): 25 TABLET, ORALLY DISINTEGRATING ORAL at 15:22

## 2023-08-04 RX ADMIN — LAMOTRIGINE 200 MILLIGRAM(S): 25 TABLET, ORALLY DISINTEGRATING ORAL at 15:22

## 2023-08-04 NOTE — PROGRESS NOTE ADULT - ASSESSMENT
A/P: 38y s/p  section, POD#3 c/b GHTN, stable  - gHTN: normotensive, no toxic complaints  -  Pain: PO motrin q6hrs, tylenol q8hrs, oxycodone for severe pain PRN  -  Post-operatively labs: post-op Hgb , hemodynamically stable, no symptoms of anemia   -  GI: tolerating regular diet, passing gas  -  : s/p hill , urinating without difficulty  -  DVT prophylaxis: encouraged increased ambulation, SCDs, Lovenox  -  Dispo: POD 3 or 4 A/P: 38y s/p  section, POD#3 c/b GHTN, stable  - gHTN: normotensive, no toxic complaints  -  Pain: PO motrin q6hrs, tylenol q8hrs, oxycodone for severe pain PRN  -  Post-operatively labs: post-op Hgb , hemodynamically stable, no symptoms of anemia   -  GI: tolerating regular diet, passing gas  -  : s/p hill , urinating without difficulty  -  DVT prophylaxis: encouraged increased ambulation, SCDs, Lovenox  -  Dispo: POD 3 or 4    attending note:  patient seen and examined  vssaf  meeting milestones  discussed with patient regarding follow up  discharge planning  -Dr. Christina Cunha

## 2023-08-04 NOTE — PROGRESS NOTE ADULT - SUBJECTIVE AND OBJECTIVE BOX
Patient evaluated at bedside. No acute events overnight. She reports pain is well controlled with OPM. Adequate urine output.She denies headache, dizziness, chest pain, palpitations, shortness of breath, nausea, vomiting or heavy vaginal bleeding.      Physical Exam:  Vital Signs Last 24 Hrs  T(C): 36.5 (01 Aug 2023 14:45), Max: 36.9 (01 Aug 2023 03:00)  T(F): 97.7 (01 Aug 2023 14:45), Max: 98.4 (01 Aug 2023 03:00)  HR: 80 (01 Aug 2023 14:45) (74 - 95)  BP: 122/76 (01 Aug 2023 14:45) (117/77 - 152/85)  BP(mean): --  RR: 18 (01 Aug 2023 14:45) (18 - 20)  SpO2: 97% (01 Aug 2023 14:45) (97% - 100%)    Parameters below as of 01 Aug 2023 14:45  Patient On (Oxygen Delivery Method): room air        GA: NAD, A+0 x 3  Abd: + BS, soft, nontender,  mildly distended, no rebound or guarding, uterus firm at midline, 2 fb below umbilicus  Incision clean, dry and intact  : lochia WNL  Extremities: no calf tenderness                            13.1   13.56 )-----------( 264      ( 31 Jul 2023 20:08 )             39.1     07-31    136  |  103  |  11  ----------------------------<  82  4.3   |  19<L>  |  0.37<L>    Ca    9.0      31 Jul 2023 20:08    TPro  7.2  /  Alb  3.6  /  TBili  0.2  /  DBili  x   /  AST  23  /  ALT  16  /  AlkPhos  100  07-31      PT/INR - ( 31 Jul 2023 20:08 )   PT: 9.9 sec;   INR: 0.86          PTT - ( 31 Jul 2023 20:08 )  PTT:30.2 sec    A/P  yo 38y s/p c/s, POD #0  ,stable    Diet: regular  Pain: OPM  IV fluid: LR @ 125cc/hr  OOB/SCDs/IS  Continue to monitor  Anticipate discharge POD #3 or #4  
Patient evaluated at bedside this morning, resting comfortable in bed.   She reports pain is well controlled.  She denies headache, scotoma, RUQ/epigastric pain, dizziness, chest pain, palpitations, shortness of breathe, nausea, vomiting or heavy vaginal bleeding.  She has been ambulating without assistance, voiding spontaneously, passing gas, tolerating regular diet and is breastfeeding.    Physical Exam:  Vital Signs Last 24 Hrs  T(C): 36.3 (03 Aug 2023 06:00), Max: 36.9 (02 Aug 2023 09:45)  T(F): 97.3 (03 Aug 2023 06:00), Max: 98.4 (02 Aug 2023 09:45)  HR: 94 (03 Aug 2023 06:00) (69 - 102)  BP: 128/84 (03 Aug 2023 06:00) (125/81 - 147/80)  BP(mean): --  RR: 17 (03 Aug 2023 06:00) (17 - 18)  SpO2: 97% (03 Aug 2023 06:00) (97% - 98%)    Parameters below as of 02 Aug 2023 22:13  Patient On (Oxygen Delivery Method): room air        GA: NAD, A+0 x 3  Abd: soft, nontender, nondistended, no rebound or guarding, incision clean, dry and intact, uterus firm at midline and below umbilicus  : lochia WNL  Extremities: no swelling or calf tenderness                             9.8    12.39 )-----------( 200      ( 02 Aug 2023 05:30 )             30.3               
Patient evaluated at bedside. No acute events overnight. She reports pain is well controlled with OPM.  She denies headache, dizziness, chest pain, palpitations, shortness of breath, nausea, vomiting or heavy vaginal bleeding.      Physical Exam:  Vital Signs Last 24 Hrs  T(C): 36.4 (03 Aug 2023 10:00), Max: 36.8 (02 Aug 2023 18:49)  T(F): 97.5 (03 Aug 2023 10:00), Max: 98.3 (02 Aug 2023 22:13)  HR: 95 (03 Aug 2023 10:00) (69 - 97)  BP: 123/75 (03 Aug 2023 10:00) (123/75 - 147/80)  BP(mean): --  RR: 18 (03 Aug 2023 10:00) (17 - 18)  SpO2: 98% (03 Aug 2023 10:00) (97% - 98%)    Parameters below as of 03 Aug 2023 10:00  Patient On (Oxygen Delivery Method): room air        GA: NAD, A+0 x 3  Abd: + BS, soft, nontender, nondistended, no rebound or guarding, uterus firm at midline, 2 fb below umbilicus  Incision clean, dry and intact  : lochia WNL  Extremities: no calf tenderness                            9.8    12.39 )-----------( 200      ( 02 Aug 2023 05:30 )             30.3         A/P  yo 38y s/p c/s, POD #2  ,stable    Diet: regular  Pain: OPM  OOB/SCDs/IS  Continue to monitor  Anticipate discharge POD #3 or #4      
Patient evaluated at bedside this morning, resting comfortable in bed, with no acute events overnight.  She reports pain is well controlled.  She denies headache, scotoma, RUQ/epigastric pain, dizziness, chest pain, palpitations, shortness of breathe, nausea, vomiting or heavy vaginal bleeding.  PT is ambulating and voiding. She is tolerating clear liquids.     Physical Exam:  Vital Signs Last 24 Hrs  T(C): 36.6 (02 Aug 2023 05:17), Max: 36.9 (01 Aug 2023 22:21)  T(F): 97.8 (02 Aug 2023 05:17), Max: 98.5 (01 Aug 2023 22:21)  HR: 100 (02 Aug 2023 05:17) (80 - 100)  BP: 123/78 (02 Aug 2023 05:17) (107/70 - 128/84)  BP(mean): --  RR: 17 (02 Aug 2023 05:17) (16 - 18)  SpO2: 97% (02 Aug 2023 05:17) (96% - 100%)    Parameters below as of 01 Aug 2023 22:21  Patient On (Oxygen Delivery Method): room air        GA: NAD, A+0 x 3  Abd: soft, nontender, nondistended, no rebound or guarding, incision clean, dry and intact, uterus firm at midline, fb below umbilicus  :  lochia WNL  Extremities: no swelling or calf tenderness, reflexes +2 bilaterally.                            13.1   13.56 )-----------( 264      ( 31 Jul 2023 20:08 )             39.1     07-31    136  |  103  |  11  ----------------------------<  82  4.3   |  19<L>  |  0.37<L>    Ca    9.0      31 Jul 2023 20:08    TPro  7.2  /  Alb  3.6  /  TBili  0.2  /  DBili  x   /  AST  23  /  ALT  16  /  AlkPhos  100  07-31      PT/INR - ( 31 Jul 2023 20:08 )   PT: 9.9 sec;   INR: 0.86          PTT - ( 31 Jul 2023 20:08 )  PTT:30.2 sec  acetaminophen     Tablet .. 975 milliGRAM(s) Oral <User Schedule>  diphenhydrAMINE 25 milliGRAM(s) Oral every 6 hours PRN  diphtheria/tetanus/pertussis (acellular) Vaccine (Adacel) 0.5 milliLiter(s) IntraMuscular once  enoxaparin Injectable 40 milliGRAM(s) SubCutaneous every 24 hours  ibuprofen  Tablet. 600 milliGRAM(s) Oral every 6 hours  lactated ringers. 1000 milliLiter(s) IV Continuous <Continuous>  lamoTRIgine 200 milliGRAM(s) Oral daily  lamoTRIgine 100 milliGRAM(s) Oral daily  lanolin Ointment 1 Application(s) Topical every 6 hours PRN  levothyroxine 75 MICROGram(s) Oral daily  magnesium hydroxide Suspension 30 milliLiter(s) Oral two times a day PRN  oxyCODONE    IR 5 milliGRAM(s) Oral once PRN  oxyCODONE    IR 5 milliGRAM(s) Oral every 3 hours PRN  oxytocin Infusion 333.333 milliUNIT(s)/Min IV Continuous <Continuous>  simethicone 80 milliGRAM(s) Chew every 4 hours PRN  
Patient evaluated at bedside this morning, resting comfortable in bed.   She reports pain is well controlled with Tylenol and Motrin.   Reports decrease in amount of vaginal bleeding  She has been ambulating without assistance, voiding spontaneously, passing gas, and tolerating regular diet without nausea/vomiting.    Physical Exam:  Vital Signs Last 24 Hrs  T(C): 36.3 (03 Aug 2023 06:00), Max: 36.9 (02 Aug 2023 09:45)  T(F): 97.3 (03 Aug 2023 06:00), Max: 98.4 (02 Aug 2023 09:45)  HR: 94 (03 Aug 2023 06:00) (69 - 102)  BP: 128/84 (03 Aug 2023 06:00) (125/81 - 147/80)  BP(mean): --  RR: 17 (03 Aug 2023 06:00) (17 - 18)  SpO2: 97% (03 Aug 2023 06:00) (97% - 98%)    Parameters below as of 02 Aug 2023 22:13  Patient On (Oxygen Delivery Method): room air        GA: Alert, comfortable, NAD  Pulm: Breathing comfortably on RA  Abd: Uterine fundus firm, tenderness appropriate with post-op state, incision clean/dry/intact  Extremities: no swelling or calf tenderness                             9.8    12.39 )-----------( 200      ( 02 Aug 2023 05:30 )             30.3               
Patient evaluated at bedside this morning, resting comfortable in bed.   She reports pain is well controlled.  She denies headache, scotoma, RUQ/epigastric pain, dizziness, chest pain, palpitations, shortness of breathe, nausea, vomiting or heavy vaginal bleeding.  She has been ambulating without assistance, voiding spontaneously, passing gas, tolerating regular diet and is breastfeeding.    Physical Exam:  Vital Signs Last 24 Hrs  T(C): 36.4 (04 Aug 2023 05:32), Max: 36.6 (03 Aug 2023 14:00)  T(F): 97.6 (04 Aug 2023 05:32), Max: 97.8 (03 Aug 2023 14:00)  HR: 84 (04 Aug 2023 05:32) (84 - 98)  BP: 136/84 (04 Aug 2023 05:32) (106/71 - 140/81)  BP(mean): --  RR: 18 (04 Aug 2023 05:32) (18 - 18)  SpO2: 98% (04 Aug 2023 05:32) (96% - 98%)    Parameters below as of 03 Aug 2023 14:00  Patient On (Oxygen Delivery Method): room air        GA: NAD, A+0 x 3  Abd: soft, nontender, nondistended, no rebound or guarding, incision clean, dry and intact, uterus firm at midline and below umbilicus  : lochia WNL  Extremities: no swelling or calf tenderness

## 2023-08-10 ENCOUNTER — APPOINTMENT (OUTPATIENT)
Dept: ANTEPARTUM | Facility: CLINIC | Age: 38
End: 2023-08-10

## 2023-08-10 DIAGNOSIS — F31.9 BIPOLAR DISORDER, UNSPECIFIED: ICD-10-CM

## 2023-08-10 DIAGNOSIS — D26.7: ICD-10-CM

## 2023-08-10 DIAGNOSIS — Z3A.37 37 WEEKS GESTATION OF PREGNANCY: ICD-10-CM

## 2023-08-10 DIAGNOSIS — O34.593 MATERNAL CARE FOR OTHER ABNORMALITIES OF GRAVID UTERUS, THIRD TRIMESTER: ICD-10-CM

## 2023-08-10 DIAGNOSIS — Z28.21 IMMUNIZATION NOT CARRIED OUT BECAUSE OF PATIENT REFUSAL: ICD-10-CM

## 2023-08-10 DIAGNOSIS — Z79.890 HORMONE REPLACEMENT THERAPY: ICD-10-CM

## 2023-08-10 DIAGNOSIS — K21.9 GASTRO-ESOPHAGEAL REFLUX DISEASE WITHOUT ESOPHAGITIS: ICD-10-CM

## 2023-08-10 DIAGNOSIS — O34.212 MATERNAL CARE FOR VERTICAL SCAR FROM PREVIOUS CESAREAN DELIVERY: ICD-10-CM

## 2023-08-10 DIAGNOSIS — F41.9 ANXIETY DISORDER, UNSPECIFIED: ICD-10-CM

## 2023-08-10 DIAGNOSIS — E03.9 HYPOTHYROIDISM, UNSPECIFIED: ICD-10-CM

## 2023-08-10 DIAGNOSIS — Z28.09 IMMUNIZATION NOT CARRIED OUT BECAUSE OF OTHER CONTRAINDICATION: ICD-10-CM

## 2023-08-11 LAB — SURGICAL PATHOLOGY STUDY: SIGNIFICANT CHANGE UP

## 2023-08-17 ENCOUNTER — APPOINTMENT (OUTPATIENT)
Dept: NEPHROLOGY | Facility: CLINIC | Age: 38
End: 2023-08-17
Payer: COMMERCIAL

## 2023-08-17 VITALS — SYSTOLIC BLOOD PRESSURE: 122 MMHG | DIASTOLIC BLOOD PRESSURE: 70 MMHG

## 2023-08-17 VITALS — HEART RATE: 88 BPM | SYSTOLIC BLOOD PRESSURE: 124 MMHG | DIASTOLIC BLOOD PRESSURE: 78 MMHG

## 2023-08-17 DIAGNOSIS — F31.9 BIPOLAR DISORDER, UNSPECIFIED: ICD-10-CM

## 2023-08-17 DIAGNOSIS — Z78.9 OTHER SPECIFIED HEALTH STATUS: ICD-10-CM

## 2023-08-17 DIAGNOSIS — I10 ESSENTIAL (PRIMARY) HYPERTENSION: ICD-10-CM

## 2023-08-17 DIAGNOSIS — E06.3 AUTOIMMUNE THYROIDITIS: ICD-10-CM

## 2023-08-17 PROCEDURE — 99204 OFFICE O/P NEW MOD 45 MIN: CPT

## 2023-08-17 RX ORDER — LEVOTHYROXINE SODIUM 75 UG/1
75 TABLET ORAL
Refills: 0 | Status: ACTIVE | COMMUNITY

## 2023-08-17 RX ORDER — LAMOTRIGINE 100 MG/1
100 TABLET ORAL
Refills: 0 | Status: ACTIVE | COMMUNITY

## 2023-08-17 RX ORDER — LAMOTRIGINE 200 MG/1
200 TABLET ORAL
Refills: 0 | Status: ACTIVE | COMMUNITY

## 2023-08-17 NOTE — CONSULT LETTER
[Dear  ___] : Dear ~OTTO, [Please see my note below.] : Please see my note below. [Consult Closing:] : Thank you very much for allowing me to participate in the care of this patient.  If you have any questions, please do not hesitate to contact me. [FreeTextEntry2] : Christina Cunha MD [FreeTextEntry1] : Her blood pressure was 122/70 mmHg and her exam was remarkable for trace ankle edema. Overall her BPs are improving. She does not need antihypertensive medications at this time I will keep you apprised of my findings.  [FreeTextEntry3] : Best personal regards, Savanah Nuñez MD, FACP Professor of Medicine [employer] at [facility]

## 2023-08-17 NOTE — ASSESSMENT
[FreeTextEntry1] : reviewed home BP readings and HIE data LEFT ARM HIGHER BP USE THIS ARM 37 yo woman with HTN 3rd trimester at 37 weeks- induced and delivered by c section on 8/1 off motrin x 48H and showing improvement in BP readings. reviewed home BP monitoring technique: seated position, arm supported on table- 3 measurements 2-5 minutes apart- record lowest BP  instructed to email readings to office weekly she offers that she prefers to not be on any BP meds- presently not inidcated-  however may see rise in BP with phentermine- ideally wait a few more weeks to see is BPs normalize completely. After 6 weeks postpartum risks of HTN are less important-  she is willing to wait for now  f/u OV 3 weeks- TTM or TEB instructed to call if BPs jump up

## 2023-08-17 NOTE — PHYSICAL EXAM
[General Appearance - Alert] : alert [General Appearance - In No Acute Distress] : in no acute distress [] : no respiratory distress [Auscultation Breath Sounds / Voice Sounds] : lungs were clear to auscultation bilaterally [Heart Rate And Rhythm] : heart rate was normal and rhythm regular [Heart Sounds] : normal S1 and S2 [Heart Sounds Gallop] : no gallops [Murmurs] : no murmurs [Heart Sounds Pericardial Friction Rub] : no pericardial rub [Oriented To Time, Place, And Person] : oriented to person, place, and time [Impaired Insight] : insight and judgment were intact [Affect] : the affect was normal [FreeTextEntry1] : tr ankle edema

## 2023-08-17 NOTE — HISTORY OF PRESENT ILLNESS
[FreeTextEntry1] : 39 yo woman here for further evaluation and management of HTN s/p C section 8/1/2023 at 37 weeks for HTN- BP improved after delivery-  no antihypertensive meds during hospitalization or at dc on 8/3-  measuring BPs at home ranging 112//'s/88-94. Was on motrin- dc'd earlier this week- (8/14) has ADHD  wants to restart phentermine- was on a few years ago still with some edema, but says this is improving no baseline HTN or CKD Denies flank pain, dysuria, hematuria or frothy urine  No HA, SOB, CP

## 2023-09-05 ENCOUNTER — APPOINTMENT (OUTPATIENT)
Dept: NEPHROLOGY | Facility: CLINIC | Age: 38
End: 2023-09-05
Payer: COMMERCIAL

## 2023-09-05 DIAGNOSIS — F90.9 ATTENTION-DEFICIT HYPERACTIVITY DISORDER, UNSPECIFIED TYPE: ICD-10-CM

## 2023-09-05 DIAGNOSIS — O16.9 UNSPECIFIED MATERNAL HYPERTENSION, UNSPECIFIED TRIMESTER: ICD-10-CM

## 2023-09-05 PROCEDURE — 99442: CPT

## 2023-09-05 RX ORDER — PHENTERMINE HYDROCHLORIDE 37.5 MG/1
37.5 TABLET ORAL
Refills: 0 | Status: ACTIVE | COMMUNITY

## 2023-09-05 NOTE — HISTORY OF PRESENT ILLNESS
[FreeTextEntry1] : 39 yo woman for follow p evaluation of HTN. IOV on 8/17, /70.Plan was to continue monitoring-  on no antihypertensive meds at this time restarted PHertermine 37.5 mg few days ago. BPs running 106-125/77-80 range even after the restart of phentermine no baseline HTN or CKD Denies flank pain, dysuria, hematuria or frothy urine  No HA, SOB, CP    s/p C section 8/1/2023 at 37 weeks for HTN- BP improved after delivery-  no antihypertensive meds during hospitalization or at dc on 8/3-  motrin- dc'd 8/14

## 2023-09-05 NOTE — ASSESSMENT
[FreeTextEntry1] : reviewed home BP readings LEFT ARM HIGHER BP USE THIS ARM 39 yo woman with HTN 3rd trimester at 37 weeks- induced and delivered by c section on 8/1 off motrin  on phentermine  BP normalized c/w home BP monitoring 2 days weekly forward readings to office given email address again. If BPs still okay through Sept and Oct can dc regular BP monitoring.  OV here as needed-

## 2023-09-05 NOTE — REASON FOR VISIT
[Home] : at home, [unfilled] , at the time of the visit. [Verbal consent obtained from patient] : the patient, [unfilled] [Follow-Up] : a follow-up visit [FreeTextEntry1] : for HTN

## 2024-02-15 ENCOUNTER — TRANSCRIPTION ENCOUNTER (OUTPATIENT)
Age: 39
End: 2024-02-15

## 2024-02-16 ENCOUNTER — TRANSCRIPTION ENCOUNTER (OUTPATIENT)
Age: 39
End: 2024-02-16

## 2024-02-16 ENCOUNTER — INPATIENT (INPATIENT)
Facility: HOSPITAL | Age: 39
LOS: 2 days | Discharge: HOME | DRG: 418 | End: 2024-02-19
Attending: SURGERY | Admitting: SURGERY
Payer: COMMERCIAL

## 2024-02-16 ENCOUNTER — RESULT REVIEW (OUTPATIENT)
Age: 39
End: 2024-02-16

## 2024-02-16 VITALS
HEART RATE: 69 BPM | RESPIRATION RATE: 16 BRPM | WEIGHT: 164.02 LBS | HEIGHT: 64 IN | DIASTOLIC BLOOD PRESSURE: 82 MMHG | SYSTOLIC BLOOD PRESSURE: 121 MMHG | OXYGEN SATURATION: 100 % | TEMPERATURE: 99 F

## 2024-02-16 DIAGNOSIS — Z98.891 HISTORY OF UTERINE SCAR FROM PREVIOUS SURGERY: Chronic | ICD-10-CM

## 2024-02-16 LAB
ALBUMIN SERPL ELPH-MCNC: 4.4 G/DL — SIGNIFICANT CHANGE UP (ref 3.3–5)
ALP SERPL-CCNC: 143 U/L — HIGH (ref 40–120)
ALT FLD-CCNC: 168 U/L — HIGH (ref 10–45)
ANION GAP SERPL CALC-SCNC: 9 MMOL/L — SIGNIFICANT CHANGE UP (ref 5–17)
APPEARANCE UR: CLEAR — SIGNIFICANT CHANGE UP
APTT BLD: 35.9 SEC — HIGH (ref 24.5–35.6)
AST SERPL-CCNC: 129 U/L — HIGH (ref 10–40)
BACTERIA # UR AUTO: NEGATIVE /HPF — SIGNIFICANT CHANGE UP
BASOPHILS # BLD AUTO: 0.02 K/UL — SIGNIFICANT CHANGE UP (ref 0–0.2)
BASOPHILS NFR BLD AUTO: 0.2 % — SIGNIFICANT CHANGE UP (ref 0–2)
BILIRUB SERPL-MCNC: 0.6 MG/DL — SIGNIFICANT CHANGE UP (ref 0.2–1.2)
BILIRUB UR-MCNC: NEGATIVE — SIGNIFICANT CHANGE UP
BLD GP AB SCN SERPL QL: NEGATIVE — SIGNIFICANT CHANGE UP
BUN SERPL-MCNC: 8 MG/DL — SIGNIFICANT CHANGE UP (ref 7–23)
CALCIUM SERPL-MCNC: 8.9 MG/DL — SIGNIFICANT CHANGE UP (ref 8.4–10.5)
CAST: 0 /LPF — SIGNIFICANT CHANGE UP (ref 0–4)
CHLORIDE SERPL-SCNC: 104 MMOL/L — SIGNIFICANT CHANGE UP (ref 96–108)
CO2 SERPL-SCNC: 27 MMOL/L — SIGNIFICANT CHANGE UP (ref 22–31)
COLOR SPEC: YELLOW — SIGNIFICANT CHANGE UP
CREAT SERPL-MCNC: 0.65 MG/DL — SIGNIFICANT CHANGE UP (ref 0.5–1.3)
DIFF PNL FLD: ABNORMAL
EGFR: 116 ML/MIN/1.73M2 — SIGNIFICANT CHANGE UP
EOSINOPHIL # BLD AUTO: 0.15 K/UL — SIGNIFICANT CHANGE UP (ref 0–0.5)
EOSINOPHIL NFR BLD AUTO: 1.5 % — SIGNIFICANT CHANGE UP (ref 0–6)
GLUCOSE SERPL-MCNC: 85 MG/DL — SIGNIFICANT CHANGE UP (ref 70–99)
GLUCOSE UR QL: NEGATIVE MG/DL — SIGNIFICANT CHANGE UP
GRAM STN FLD: SIGNIFICANT CHANGE UP
HCG SERPL-ACNC: <0 MIU/ML — SIGNIFICANT CHANGE UP
HCT VFR BLD CALC: 42.1 % — SIGNIFICANT CHANGE UP (ref 34.5–45)
HGB BLD-MCNC: 13.3 G/DL — SIGNIFICANT CHANGE UP (ref 11.5–15.5)
IMM GRANULOCYTES NFR BLD AUTO: 0.3 % — SIGNIFICANT CHANGE UP (ref 0–0.9)
INR BLD: 1.04 — SIGNIFICANT CHANGE UP (ref 0.85–1.18)
KETONES UR-MCNC: NEGATIVE MG/DL — SIGNIFICANT CHANGE UP
LEUKOCYTE ESTERASE UR-ACNC: NEGATIVE — SIGNIFICANT CHANGE UP
LIDOCAIN IGE QN: 20 U/L — SIGNIFICANT CHANGE UP (ref 7–60)
LYMPHOCYTES # BLD AUTO: 1.38 K/UL — SIGNIFICANT CHANGE UP (ref 1–3.3)
LYMPHOCYTES # BLD AUTO: 14.1 % — SIGNIFICANT CHANGE UP (ref 13–44)
MCHC RBC-ENTMCNC: 27 PG — SIGNIFICANT CHANGE UP (ref 27–34)
MCHC RBC-ENTMCNC: 31.6 GM/DL — LOW (ref 32–36)
MCV RBC AUTO: 85.6 FL — SIGNIFICANT CHANGE UP (ref 80–100)
MONOCYTES # BLD AUTO: 0.51 K/UL — SIGNIFICANT CHANGE UP (ref 0–0.9)
MONOCYTES NFR BLD AUTO: 5.2 % — SIGNIFICANT CHANGE UP (ref 2–14)
NEUTROPHILS # BLD AUTO: 7.73 K/UL — HIGH (ref 1.8–7.4)
NEUTROPHILS NFR BLD AUTO: 78.7 % — HIGH (ref 43–77)
NITRITE UR-MCNC: NEGATIVE — SIGNIFICANT CHANGE UP
NRBC # BLD: 0 /100 WBCS — SIGNIFICANT CHANGE UP (ref 0–0)
PH UR: 7.5 — SIGNIFICANT CHANGE UP (ref 5–8)
PLATELET # BLD AUTO: 385 K/UL — SIGNIFICANT CHANGE UP (ref 150–400)
POTASSIUM SERPL-MCNC: 4.3 MMOL/L — SIGNIFICANT CHANGE UP (ref 3.5–5.3)
POTASSIUM SERPL-SCNC: 4.3 MMOL/L — SIGNIFICANT CHANGE UP (ref 3.5–5.3)
PROT SERPL-MCNC: 7.5 G/DL — SIGNIFICANT CHANGE UP (ref 6–8.3)
PROT UR-MCNC: NEGATIVE MG/DL — SIGNIFICANT CHANGE UP
PROTHROM AB SERPL-ACNC: 11.8 SEC — SIGNIFICANT CHANGE UP (ref 9.5–13)
RBC # BLD: 4.92 M/UL — SIGNIFICANT CHANGE UP (ref 3.8–5.2)
RBC # FLD: 14.6 % — HIGH (ref 10.3–14.5)
RBC CASTS # UR COMP ASSIST: 2 /HPF — SIGNIFICANT CHANGE UP (ref 0–4)
RH IG SCN BLD-IMP: POSITIVE — SIGNIFICANT CHANGE UP
RH IG SCN BLD-IMP: POSITIVE — SIGNIFICANT CHANGE UP
SODIUM SERPL-SCNC: 140 MMOL/L — SIGNIFICANT CHANGE UP (ref 135–145)
SP GR SPEC: 1.01 — SIGNIFICANT CHANGE UP (ref 1–1.03)
SPECIMEN SOURCE: SIGNIFICANT CHANGE UP
SQUAMOUS # UR AUTO: 3 /HPF — SIGNIFICANT CHANGE UP (ref 0–5)
UROBILINOGEN FLD QL: 0.2 MG/DL — SIGNIFICANT CHANGE UP (ref 0.2–1)
WBC # BLD: 9.82 K/UL — SIGNIFICANT CHANGE UP (ref 3.8–10.5)
WBC # FLD AUTO: 9.82 K/UL — SIGNIFICANT CHANGE UP (ref 3.8–10.5)
WBC UR QL: 1 /HPF — SIGNIFICANT CHANGE UP (ref 0–5)

## 2024-02-16 PROCEDURE — 88304 TISSUE EXAM BY PATHOLOGIST: CPT | Mod: 26

## 2024-02-16 PROCEDURE — 76705 ECHO EXAM OF ABDOMEN: CPT | Mod: 26

## 2024-02-16 PROCEDURE — 99285 EMERGENCY DEPT VISIT HI MDM: CPT

## 2024-02-16 DEVICE — CLIP APPLIER ETHICON LIGAMAX 5MM: Type: IMPLANTABLE DEVICE | Status: FUNCTIONAL

## 2024-02-16 RX ORDER — LAMOTRIGINE 25 MG/1
300 TABLET, ORALLY DISINTEGRATING ORAL DAILY
Refills: 0 | Status: DISCONTINUED | OUTPATIENT
Start: 2024-02-16 | End: 2024-02-19

## 2024-02-16 RX ORDER — SODIUM CHLORIDE 9 MG/ML
1000 INJECTION, SOLUTION INTRAVENOUS
Refills: 0 | Status: DISCONTINUED | OUTPATIENT
Start: 2024-02-16 | End: 2024-02-16

## 2024-02-16 RX ORDER — LEVOTHYROXINE SODIUM 125 MCG
60 TABLET ORAL AT BEDTIME
Refills: 0 | Status: DISCONTINUED | OUTPATIENT
Start: 2024-02-16 | End: 2024-02-19

## 2024-02-16 RX ORDER — ACETAMINOPHEN 500 MG
1000 TABLET ORAL ONCE
Refills: 0 | Status: COMPLETED | OUTPATIENT
Start: 2024-02-16 | End: 2024-02-17

## 2024-02-16 RX ORDER — HYDROMORPHONE HYDROCHLORIDE 2 MG/ML
0.15 INJECTION INTRAMUSCULAR; INTRAVENOUS; SUBCUTANEOUS
Refills: 0 | Status: DISCONTINUED | OUTPATIENT
Start: 2024-02-16 | End: 2024-02-17

## 2024-02-16 RX ORDER — CEFTRIAXONE 500 MG/1
1000 INJECTION, POWDER, FOR SOLUTION INTRAMUSCULAR; INTRAVENOUS EVERY 24 HOURS
Refills: 0 | Status: DISCONTINUED | OUTPATIENT
Start: 2024-02-16 | End: 2024-02-18

## 2024-02-16 RX ORDER — IBUPROFEN 200 MG
600 TABLET ORAL ONCE
Refills: 0 | Status: DISCONTINUED | OUTPATIENT
Start: 2024-02-16 | End: 2024-02-16

## 2024-02-16 RX ORDER — HEPARIN SODIUM 5000 [USP'U]/ML
5000 INJECTION INTRAVENOUS; SUBCUTANEOUS EVERY 8 HOURS
Refills: 0 | Status: DISCONTINUED | OUTPATIENT
Start: 2024-02-17 | End: 2024-02-19

## 2024-02-16 RX ORDER — OXYCODONE HYDROCHLORIDE 5 MG/1
5 TABLET ORAL EVERY 6 HOURS
Refills: 0 | Status: DISCONTINUED | OUTPATIENT
Start: 2024-02-16 | End: 2024-02-17

## 2024-02-16 RX ORDER — METRONIDAZOLE 500 MG
TABLET ORAL
Refills: 0 | Status: DISCONTINUED | OUTPATIENT
Start: 2024-02-16 | End: 2024-02-18

## 2024-02-16 RX ORDER — METRONIDAZOLE 500 MG
500 TABLET ORAL ONCE
Refills: 0 | Status: COMPLETED | OUTPATIENT
Start: 2024-02-16 | End: 2024-02-16

## 2024-02-16 RX ORDER — ONDANSETRON 8 MG/1
4 TABLET, FILM COATED ORAL EVERY 6 HOURS
Refills: 0 | Status: DISCONTINUED | OUTPATIENT
Start: 2024-02-16 | End: 2024-02-19

## 2024-02-16 RX ORDER — METRONIDAZOLE 500 MG
500 TABLET ORAL EVERY 8 HOURS
Refills: 0 | Status: DISCONTINUED | OUTPATIENT
Start: 2024-02-16 | End: 2024-02-18

## 2024-02-16 RX ADMIN — Medication 100 MILLIGRAM(S): at 12:57

## 2024-02-16 RX ADMIN — OXYCODONE HYDROCHLORIDE 5 MILLIGRAM(S): 5 TABLET ORAL at 21:29

## 2024-02-16 RX ADMIN — Medication 100 MILLIGRAM(S): at 21:29

## 2024-02-16 RX ADMIN — SODIUM CHLORIDE 115 MILLILITER(S): 9 INJECTION, SOLUTION INTRAVENOUS at 12:55

## 2024-02-16 RX ADMIN — HYDROMORPHONE HYDROCHLORIDE 0.15 MILLIGRAM(S): 2 INJECTION INTRAMUSCULAR; INTRAVENOUS; SUBCUTANEOUS at 18:01

## 2024-02-16 RX ADMIN — OXYCODONE HYDROCHLORIDE 5 MILLIGRAM(S): 5 TABLET ORAL at 22:29

## 2024-02-16 RX ADMIN — CEFTRIAXONE 100 MILLIGRAM(S): 500 INJECTION, POWDER, FOR SOLUTION INTRAMUSCULAR; INTRAVENOUS at 12:30

## 2024-02-16 NOTE — ED ADULT TRIAGE NOTE - CHIEF COMPLAINT QUOTE
"I was in [place] yesterday, had severe RUQ pain, went to the ER, and they said I needed emergency cholecystectomy but said I could come back to [place] to get it." Pain well controlled currently on oxycodone.

## 2024-02-16 NOTE — H&P ADULT - ASSESSMENT
Assessment:  37yo Female pt with PMHx , PSHx c-sections x2, who presents with x2 day hx of severe RUQ abdominal pain. In the ED, VS wnl. On exam, soft, ND, Mild ttp in RUQ and epigastrium. +[place]. No rebound or guarding.Labs significant for WBC 9 (20 yesterday at [facility]), Hgb 13.3, T bili 0.6, , AST//168. Abdominal US showing cholelithiasis without signs of acute cholecystitis, CBD measures up to 6mm. Ddx includes symptomatic cholelithiasis, but more likely to be acute cholecystitis w/ questionable choledocholithiasis in setting of recent leukocytosis, RUQ ttp, and w/ CBD borderline dilated up to 6mm despite normal T bili.     Plan:  - admit to regional, Dr. Shepard  - NPO/IVF  - CTX/Flagyl  - OR pending further workup  - Pain/nausea control prn  - Home meds as appropriate - c/w IV synthroid, PO lamictal  - SCDs  - AM labs, preop  Plan discussed with chief resident and attending, Dr. Shepard Assessment:  37yo Female pt with PMHx , PSHx c-sections x2, who presents with x2 day hx of severe RUQ abdominal pain. In the ED, VS wnl. On exam, soft, ND, Mild ttp in RUQ and epigastrium. +[place]. No rebound or guarding.Labs significant for WBC 9 (20 yesterday at [facility]), Hgb 13.3, T bili 0.6, , AST//168. Abdominal US showing cholelithiasis without signs of acute cholecystitis, CBD measures up to 6mm. Ddx includes symptomatic cholelithiasis, but more likely to be acute cholecystitis w/ questionable choledocholithiasis in setting of recent leukocytosis, RUQ ttp, and w/ CBD borderline dilated up to 6mm despite normal T bili.     Plan:  - admit to regional, Dr. Shepard  - NPO/IVF  - CTX/Flagyl  - OR class 3 lap behzad  - Pain/nausea control prn  - Home meds as appropriate - c/w IV synthroid, PO lamictal  - SCDs  - AM labs, preop  Plan discussed with chief resident and attending, Dr. Shepard

## 2024-02-16 NOTE — CHART NOTE - NSCHARTNOTEFT_GEN_A_CORE
Team 4 Surgery Post-Op Note, PCN:     Pre-Op Dx: Acute behzad  Procedure: Laparoscopic cholecystectomy      Surgeon: Devyn    Subjective: Patient seen at bedside in PACU. Patient denies any shortness of breath, chest pain, nausea, or vomiting. Patient does endorses abdominal pain. Refusing oxycodone or dilaudid.       Vital Signs Last 24 Hrs  T(C): 36.4 (2024 18:39), Max: 37.4 (2024 09:07)  T(F): 97.5 (2024 18:39), Max: 99.3 (2024 09:07)  HR: 64 (2024 18:39) (64 - 81)  BP: 126/78 (2024 18:39) (101/65 - 134/71)  BP(mean): 92 (2024 18:01) (85 - 97)  RR: 16 (2024 18:39) (14 - 17)  SpO2: 98% (2024 18:39) (94% - 100%)    Parameters below as of 2024 18:39  Patient On (Oxygen Delivery Method): room air        Physical Exam:  General: NAD, resting comfortably in bed  Pulmonary: Nonlabored breathing, no respiratory distress  Cardiovascular: NSR  Abdominal: soft, ND, Appropriate incisional tenderness Incision clean, dry, and intact. SANJU drain serosang  Extremities: WWP, normal strength  Neuro: A/O x 3, CNs II-XII grossly intact    LABS:                        13.3   9.82  )-----------( 385      ( 2024 09:17 )             42.1     02-    140  |  104  |  8   ----------------------------<  85  4.3   |  27  |  0.65    Ca    8.9      2024 09:17    TPro  7.5  /  Alb  4.4  /  TBili  0.6  /  DBili  x   /  AST  129<H>  /  ALT  168<H>  /  AlkPhos  143<H>  02-16    PT/INR - ( 2024 09:17 )   PT: 11.8 sec;   INR: 1.04          PTT - ( 2024 09:17 )  PTT:35.9 sec  CAPILLARY BLOOD GLUCOSE        Urinalysis Basic - ( 2024 09:17 )    Color: Yellow / Appearance: Clear / S.009 / pH: x  Gluc: 85 mg/dL / Ketone: Negative mg/dL  / Bili: Negative / Urobili: 0.2 mg/dL   Blood: x / Protein: Negative mg/dL / Nitrite: Negative   Leuk Esterase: Negative / RBC: 2 /HPF / WBC 1 /HPF   Sq Epi: x / Non Sq Epi: 3 /HPF / Bacteria: Negative /HPF      LIVER FUNCTIONS - ( 2024 09:17 )  Alb: 4.4 g/dL / Pro: 7.5 g/dL / ALK PHOS: 143 U/L / ALT: 168 U/L / AST: 129 U/L / GGT: x           ABO Interpretation: A ( @ 09:42)        Radiology and Additional Studies:    Assessment:38y Female s/p above procedure    Plan:  Pain/nausea control PRN  Home meds  Incentive spirometer/OOB/Ambulate  IVF, Diet: Regular  AM labs  ToV 11pm

## 2024-02-16 NOTE — PRE-ANESTHESIA EVALUATION ADULT - NSANTHPMHFT_GEN_ALL_CORE
37yo Female pt with PMHx , PSHx c-sections x2, who presents with x2 day hx of severe RUQ abdominal pain. In the ED, VS wnl. On exam, soft, ND, Mild ttp in RUQ and epigastrium. +[place]. No rebound or guarding.Labs significant for WBC 9 (20 yesterday at [facility]), Hgb 13.3, T bili 0.6, , AST//168. Abdominal US showing cholelithiasis without signs of acute cholecystitis, CBD measures up to 6mm. Ddx includes symptomatic cholelithiasis, but more likely to be acute cholecystitis w/ questionable choledocholithiasis in setting of recent leukocytosis, RUQ ttp, and w/ CBD borderline dilated up to 6mm despite normal T bili.     Plan:  - admit to regional, Dr. Shepard  - NPO/IVF  - CTX/Flagyl  - OR class 3 lap behzda  - Pain/nausea control prn  - Home meds as appropriate - c/w IV synthroid, PO lamictal  - SCDs  - AM labs, preop  Plan discussed with chief resident and attending, Dr. Shepard

## 2024-02-16 NOTE — H&P ADULT - NSHPLABSRESULTS_GEN_ALL_CORE
LABS:                        13.3   9.82  )-----------( 385      ( 16 Feb 2024 09:17 )             42.1     02-16    140  |  104  |  8   ----------------------------<  85  4.3   |  27  |  0.65    Ca    8.9      16 Feb 2024 09:17    TPro  7.5  /  Alb  4.4  /  TBili  0.6  /  DBili  x   /  AST  129<H>  /  ALT  168<H>  /  AlkPhos  143<H>  02-16    PT/INR - ( 16 Feb 2024 09:17 )   PT: 11.8 sec;   INR: 1.04          PTT - ( 16 Feb 2024 09:17 )  PTT:35.9 sec LABS:                        13.3   9.82  )-----------( 385      ( 16 Feb 2024 09:17 )             42.1     02-16    140  |  104  |  8   ----------------------------<  85  4.3   |  27  |  0.65    Ca    8.9      16 Feb 2024 09:17    TPro  7.5  /  Alb  4.4  /  TBili  0.6  /  DBili  x   /  AST  129<H>  /  ALT  168<H>  /  AlkPhos  143<H>  02-16    PT/INR - ( 16 Feb 2024 09:17 )   PT: 11.8 sec;   INR: 1.04          PTT - ( 16 Feb 2024 09:17 )  PTT:35.9 sec    EXAM: ER US ABDOMEN LTD    PROCEDURE DATE: 02/16/2024              INTERPRETATION: Grayscale imaging of the right upper quadrant was performed.  The gallbladder was visualized and scanned in longitudinal and transverse planes.  The anterior gallbladder wall measured 0.24.cm.  The common bile duct measured 0.51-0.60.cm.  Gallstones present - mobile.  Sludge not present.  Pericholecystic fluid not present.  Gallbladder wall edema not present.  Sonographic Preston's sign not present.    IMPRESSION:Cholelithiasis without acute signs of acute cholecystitis.    --- End of Report ---    "Thank you for the opportunity to participate in the care of this patient."      MCKENZIE WHITMAN MD; Attending Emergency Medicine  This document has been electronically signed. Feb 16 2024 10:26AM

## 2024-02-16 NOTE — ED PROVIDER NOTE - OBJECTIVE STATEMENT
37 y/o f with PMH of bipolar,  x 2 presents to ED with ruq pain, nausea, vomiting x 1 day.  Pt states she was seen in hospital in [place], [place] day prior and was dx with gallstones, wbc 20, received ceftriaxone and pain meds and offered cholecystectomy.  Pt did not want surgery in [place] and flew back to [place] today.  Pt states pain has improved since day prior - no further vomiting.  No fever or chills.  Last took oxycodone night prior.

## 2024-02-16 NOTE — PRE-ANESTHESIA EVALUATION ADULT - HEIGHT IN FEET
Kali Watson is a 33 year old male presenting to the walk-in clinic today alone for 5 day hx of fever, chills and cough.    Treatment tried prior to visit: Sudafed, Mucinex and Tylenol and Unisom    Swabs/Specimens collected during rooming process:  None    Work, School or  note needed: No    New vs Established: Established    Patient would like communication of their results via:      Cell Phone:   Telephone Information:   Mobile [phone]     Okay to leave a message containing results? Yes     5

## 2024-02-16 NOTE — ED PROVIDER NOTE - CLINICAL SUMMARY MEDICAL DECISION MAKING FREE TEXT BOX
39 y/o f with PMH of bipolar,  x 2 presents to ED with ruq pain, nausea, vomiting x 1 day.  Pt states she was seen in hospital in [place], [place] day prior and was dx with gallstones, wbc 20, received ceftriaxone and pain meds and offered cholecystectomy.  Pt did not want surgery in [place] and flew back to [place] today.  Pt states pain has improved since day prior - no further vomiting.  No fever or chills.  Last took oxycodone night prior.    VSS  PE - mild ruq tenderness  Labs show wbc 9, elevated LFTs  POCUS - + stones findings c/w cholelithiasis  Seen by surgery and will admit for surgery today

## 2024-02-16 NOTE — ED ADULT NURSE NOTE - OBJECTIVE STATEMENT
37 yo F PMHx Bipolar disorder, Hypothyroidism, ADD presents to the ED for emergent cholecystectomy. Pt awake and alert, AOx4. pt reports she flew to [place] yesterday to go skiing with her son, but when the plane landed she was in excruciating pain and had to call the paramedics. pt reports the pain was in her epigastric region and she had sharp pain when they palpated her RUQ in the [place] ED. Pt reports they told her should could fly back to [place], but would need an emergent cholecystectomy. Pt reports the day before she flew to [place] she ate a lot of fatty foods. Pt reports they gave her an oxycodone prescription in [place] and she last took it at 9:30 PM [place] time (11:30 PM [place] time). Pt reports she last ate at 6:30 PM [place] time (8:30 PM [place] time). Pt denies current pain. Pt denies CP, SOB, N/V/D, f/c, lightheadedness, dizziness, numbness, tingling, vision changes.

## 2024-02-16 NOTE — BRIEF OPERATIVE NOTE - OPERATION/FINDINGS
Pt placed in reverse Trendelenburg w/ right side up. Omental attachments to GB were gently swept away. GB fundus retracted superiorly over dome of liver. Filmy adhesions between the GB & omentum/duo cauterized. GB infundibulum retracted laterally towards RLQ exposing Calot’s triangle. Critical view of safety was then obtained. Cystic duct and artery clipped and divided. Peritoneal attachments btw GB & liver bed  w/ electrocautery. Hemostasis achieved. SANJU placed. No leakage of bile from cystic duct stump. GB was removed without incident, fascia closed with Maaxon and incisions were closed w/ Monocryl sutures. Pt placed in reverse Trendelenburg w/ right side up. Omental attachments to GB were gently swept away. GB bile drained with a needle. GB fundus retracted superiorly over dome of liver. Filmy adhesions between the GB & omentum/duo cauterized. GB infundibulum retracted laterally towards RLQ exposing Calot’s triangle. Critical view of safety was then obtained. Cystic duct and artery clipped and divided. Peritoneal attachments btw GB & liver bed  w/ electrocautery. Hemostasis achieved. Bile tinged fluid noted with irrigation - no bile leak from cystic duct stump, no injuries noted, liver bed inspected. SANJU placed. GB was removed without incident, fascia closed with Maaxon and incisions were closed w/ Monocryl sutures.

## 2024-02-16 NOTE — H&P ADULT - HISTORY OF PRESENT ILLNESS
39yo Female pt with PMHx bipolar II disorder, hypothyroidism, PSHx c-sections x2 (2019, 2023), who presents with x2 day hx of severe RUQ abdominal pain, with nausea and vomiting.    Patient reports that she went to [place] x2 days ago and after she landed, she was eating unhealthy foods and started experiencing severe RUQ abdominal pain. She went to [facility] and was worked up and ultimately found to have symptomatic cholelithiasis on RUQ US, however with a WBC 20, and RUQ tenderness, more suspicious for early acute cholecystitis. She was given IV abx and was offered a cholecystectomy but preferred to be evaluated in [facility]. She reports that she has had an episode of nausea and vomiting x1 last month, but otherwise no similar symptoms.      In the ED, VS wnl. On exam, soft, ND, Mild ttp in RUQ and epigastrium. +[place]. No rebound or guarding. Labs significant for WBC 9 (20 yesterday at [facility]), Hgb 13.3, T bili 0.6, , AST//168. Abdominal US showing cholelithiasis without signs of acute cholecystitis, CBD measures up to 6mm.    PMH: bipolar II disorder, hypothyroidism  PSHx: c-sections x2 (2019, 2023)  Medications: lamictal 300mg, synthroid 75mcg, phentermine 74.5mg  Allergies: NKDA  Social Hx: denies  Family Hx: Denies family hx of IBS, Crohn's, UC, or colon cancer.  Last colonoscopy: never  Last EGD: never  
130

## 2024-02-17 LAB
ALBUMIN SERPL ELPH-MCNC: 3.5 G/DL — SIGNIFICANT CHANGE UP (ref 3.3–5)
ALBUMIN SERPL ELPH-MCNC: 3.8 G/DL — SIGNIFICANT CHANGE UP (ref 3.3–5)
ALP SERPL-CCNC: 172 U/L — HIGH (ref 40–120)
ALP SERPL-CCNC: 203 U/L — HIGH (ref 40–120)
ALT FLD-CCNC: 220 U/L — HIGH (ref 10–45)
ALT FLD-CCNC: 328 U/L — HIGH (ref 10–45)
ANION GAP SERPL CALC-SCNC: 12 MMOL/L — SIGNIFICANT CHANGE UP (ref 5–17)
ANION GAP SERPL CALC-SCNC: 13 MMOL/L — SIGNIFICANT CHANGE UP (ref 5–17)
AST SERPL-CCNC: 203 U/L — HIGH (ref 10–40)
AST SERPL-CCNC: 336 U/L — HIGH (ref 10–40)
BILIRUB DIRECT SERPL-MCNC: 1.1 MG/DL — HIGH (ref 0–0.3)
BILIRUB INDIRECT FLD-MCNC: 0.7 MG/DL — SIGNIFICANT CHANGE UP (ref 0.2–1)
BILIRUB SERPL-MCNC: 1.7 MG/DL — HIGH (ref 0.2–1.2)
BILIRUB SERPL-MCNC: 2.7 MG/DL — HIGH (ref 0.2–1.2)
BUN SERPL-MCNC: 5 MG/DL — LOW (ref 7–23)
BUN SERPL-MCNC: 6 MG/DL — LOW (ref 7–23)
CALCIUM SERPL-MCNC: 8.3 MG/DL — LOW (ref 8.4–10.5)
CALCIUM SERPL-MCNC: 8.4 MG/DL — SIGNIFICANT CHANGE UP (ref 8.4–10.5)
CHLORIDE SERPL-SCNC: 101 MMOL/L — SIGNIFICANT CHANGE UP (ref 96–108)
CHLORIDE SERPL-SCNC: 102 MMOL/L — SIGNIFICANT CHANGE UP (ref 96–108)
CO2 SERPL-SCNC: 21 MMOL/L — LOW (ref 22–31)
CO2 SERPL-SCNC: 24 MMOL/L — SIGNIFICANT CHANGE UP (ref 22–31)
CREAT SERPL-MCNC: 0.55 MG/DL — SIGNIFICANT CHANGE UP (ref 0.5–1.3)
CREAT SERPL-MCNC: 0.55 MG/DL — SIGNIFICANT CHANGE UP (ref 0.5–1.3)
EGFR: 121 ML/MIN/1.73M2 — SIGNIFICANT CHANGE UP
EGFR: 121 ML/MIN/1.73M2 — SIGNIFICANT CHANGE UP
GLUCOSE SERPL-MCNC: 102 MG/DL — HIGH (ref 70–99)
GLUCOSE SERPL-MCNC: 93 MG/DL — SIGNIFICANT CHANGE UP (ref 70–99)
HCT VFR BLD CALC: 37 % — SIGNIFICANT CHANGE UP (ref 34.5–45)
HGB BLD-MCNC: 12 G/DL — SIGNIFICANT CHANGE UP (ref 11.5–15.5)
MAGNESIUM SERPL-MCNC: 2 MG/DL — SIGNIFICANT CHANGE UP (ref 1.6–2.6)
MCHC RBC-ENTMCNC: 27.3 PG — SIGNIFICANT CHANGE UP (ref 27–34)
MCHC RBC-ENTMCNC: 32.4 GM/DL — SIGNIFICANT CHANGE UP (ref 32–36)
MCV RBC AUTO: 84.1 FL — SIGNIFICANT CHANGE UP (ref 80–100)
NRBC # BLD: 0 /100 WBCS — SIGNIFICANT CHANGE UP (ref 0–0)
PHOSPHATE SERPL-MCNC: 3.4 MG/DL — SIGNIFICANT CHANGE UP (ref 2.5–4.5)
PLATELET # BLD AUTO: 421 K/UL — HIGH (ref 150–400)
POTASSIUM SERPL-MCNC: 3.5 MMOL/L — SIGNIFICANT CHANGE UP (ref 3.5–5.3)
POTASSIUM SERPL-MCNC: 4.3 MMOL/L — SIGNIFICANT CHANGE UP (ref 3.5–5.3)
POTASSIUM SERPL-SCNC: 3.5 MMOL/L — SIGNIFICANT CHANGE UP (ref 3.5–5.3)
POTASSIUM SERPL-SCNC: 4.3 MMOL/L — SIGNIFICANT CHANGE UP (ref 3.5–5.3)
PROT SERPL-MCNC: 6.9 G/DL — SIGNIFICANT CHANGE UP (ref 6–8.3)
PROT SERPL-MCNC: 7 G/DL — SIGNIFICANT CHANGE UP (ref 6–8.3)
RBC # BLD: 4.4 M/UL — SIGNIFICANT CHANGE UP (ref 3.8–5.2)
RBC # FLD: 14.7 % — HIGH (ref 10.3–14.5)
SODIUM SERPL-SCNC: 136 MMOL/L — SIGNIFICANT CHANGE UP (ref 135–145)
SODIUM SERPL-SCNC: 137 MMOL/L — SIGNIFICANT CHANGE UP (ref 135–145)
WBC # BLD: 15.2 K/UL — HIGH (ref 3.8–10.5)
WBC # FLD AUTO: 15.2 K/UL — HIGH (ref 3.8–10.5)

## 2024-02-17 PROCEDURE — 99223 1ST HOSP IP/OBS HIGH 75: CPT

## 2024-02-17 PROCEDURE — 99221 1ST HOSP IP/OBS SF/LOW 40: CPT

## 2024-02-17 RX ORDER — HYDROMORPHONE HYDROCHLORIDE 2 MG/ML
0.5 INJECTION INTRAMUSCULAR; INTRAVENOUS; SUBCUTANEOUS ONCE
Refills: 0 | Status: DISCONTINUED | OUTPATIENT
Start: 2024-02-17 | End: 2024-02-17

## 2024-02-17 RX ORDER — SIMETHICONE 80 MG/1
80 TABLET, CHEWABLE ORAL ONCE
Refills: 0 | Status: COMPLETED | OUTPATIENT
Start: 2024-02-17 | End: 2024-02-17

## 2024-02-17 RX ORDER — OXYCODONE HYDROCHLORIDE 5 MG/1
2.5 TABLET ORAL EVERY 6 HOURS
Refills: 0 | Status: DISCONTINUED | OUTPATIENT
Start: 2024-02-17 | End: 2024-02-19

## 2024-02-17 RX ORDER — IBUPROFEN 200 MG
600 TABLET ORAL EVERY 6 HOURS
Refills: 0 | Status: DISCONTINUED | OUTPATIENT
Start: 2024-02-17 | End: 2024-02-19

## 2024-02-17 RX ORDER — OXYCODONE HYDROCHLORIDE 5 MG/1
5 TABLET ORAL EVERY 6 HOURS
Refills: 0 | Status: DISCONTINUED | OUTPATIENT
Start: 2024-02-17 | End: 2024-02-19

## 2024-02-17 RX ORDER — POTASSIUM CHLORIDE 20 MEQ
40 PACKET (EA) ORAL ONCE
Refills: 0 | Status: COMPLETED | OUTPATIENT
Start: 2024-02-17 | End: 2024-02-17

## 2024-02-17 RX ORDER — HYDROMORPHONE HYDROCHLORIDE 2 MG/ML
0.25 INJECTION INTRAMUSCULAR; INTRAVENOUS; SUBCUTANEOUS EVERY 4 HOURS
Refills: 0 | Status: DISCONTINUED | OUTPATIENT
Start: 2024-02-17 | End: 2024-02-19

## 2024-02-17 RX ORDER — ACETAMINOPHEN 500 MG
650 TABLET ORAL EVERY 6 HOURS
Refills: 0 | Status: DISCONTINUED | OUTPATIENT
Start: 2024-02-17 | End: 2024-02-19

## 2024-02-17 RX ADMIN — HYDROMORPHONE HYDROCHLORIDE 0.25 MILLIGRAM(S): 2 INJECTION INTRAMUSCULAR; INTRAVENOUS; SUBCUTANEOUS at 22:08

## 2024-02-17 RX ADMIN — Medication 650 MILLIGRAM(S): at 11:47

## 2024-02-17 RX ADMIN — Medication 650 MILLIGRAM(S): at 11:09

## 2024-02-17 RX ADMIN — HYDROMORPHONE HYDROCHLORIDE 0.25 MILLIGRAM(S): 2 INJECTION INTRAMUSCULAR; INTRAVENOUS; SUBCUTANEOUS at 17:30

## 2024-02-17 RX ADMIN — OXYCODONE HYDROCHLORIDE 5 MILLIGRAM(S): 5 TABLET ORAL at 12:04

## 2024-02-17 RX ADMIN — OXYCODONE HYDROCHLORIDE 5 MILLIGRAM(S): 5 TABLET ORAL at 13:00

## 2024-02-17 RX ADMIN — HYDROMORPHONE HYDROCHLORIDE 0.5 MILLIGRAM(S): 2 INJECTION INTRAMUSCULAR; INTRAVENOUS; SUBCUTANEOUS at 08:09

## 2024-02-17 RX ADMIN — LAMOTRIGINE 300 MILLIGRAM(S): 25 TABLET, ORALLY DISINTEGRATING ORAL at 11:09

## 2024-02-17 RX ADMIN — HYDROMORPHONE HYDROCHLORIDE 0.25 MILLIGRAM(S): 2 INJECTION INTRAMUSCULAR; INTRAVENOUS; SUBCUTANEOUS at 23:08

## 2024-02-17 RX ADMIN — HEPARIN SODIUM 5000 UNIT(S): 5000 INJECTION INTRAVENOUS; SUBCUTANEOUS at 17:11

## 2024-02-17 RX ADMIN — Medication 100 MILLIGRAM(S): at 06:11

## 2024-02-17 RX ADMIN — Medication 400 MILLIGRAM(S): at 05:30

## 2024-02-17 RX ADMIN — HEPARIN SODIUM 5000 UNIT(S): 5000 INJECTION INTRAVENOUS; SUBCUTANEOUS at 00:09

## 2024-02-17 RX ADMIN — OXYCODONE HYDROCHLORIDE 5 MILLIGRAM(S): 5 TABLET ORAL at 18:39

## 2024-02-17 RX ADMIN — SIMETHICONE 80 MILLIGRAM(S): 80 TABLET, CHEWABLE ORAL at 14:33

## 2024-02-17 RX ADMIN — CEFTRIAXONE 100 MILLIGRAM(S): 500 INJECTION, POWDER, FOR SOLUTION INTRAMUSCULAR; INTRAVENOUS at 11:13

## 2024-02-17 RX ADMIN — Medication 40 MILLIEQUIVALENT(S): at 16:26

## 2024-02-17 RX ADMIN — HYDROMORPHONE HYDROCHLORIDE 0.5 MILLIGRAM(S): 2 INJECTION INTRAMUSCULAR; INTRAVENOUS; SUBCUTANEOUS at 08:30

## 2024-02-17 RX ADMIN — Medication 1000 MILLIGRAM(S): at 05:50

## 2024-02-17 RX ADMIN — HYDROMORPHONE HYDROCHLORIDE 0.25 MILLIGRAM(S): 2 INJECTION INTRAMUSCULAR; INTRAVENOUS; SUBCUTANEOUS at 17:10

## 2024-02-17 RX ADMIN — OXYCODONE HYDROCHLORIDE 5 MILLIGRAM(S): 5 TABLET ORAL at 04:30

## 2024-02-17 RX ADMIN — OXYCODONE HYDROCHLORIDE 5 MILLIGRAM(S): 5 TABLET ORAL at 19:39

## 2024-02-17 RX ADMIN — Medication 600 MILLIGRAM(S): at 20:43

## 2024-02-17 RX ADMIN — Medication 100 MILLIGRAM(S): at 14:32

## 2024-02-17 RX ADMIN — Medication 100 MILLIGRAM(S): at 21:47

## 2024-02-17 RX ADMIN — Medication 650 MILLIGRAM(S): at 20:53

## 2024-02-17 RX ADMIN — Medication 60 MICROGRAM(S): at 21:47

## 2024-02-17 RX ADMIN — OXYCODONE HYDROCHLORIDE 5 MILLIGRAM(S): 5 TABLET ORAL at 03:30

## 2024-02-17 RX ADMIN — HEPARIN SODIUM 5000 UNIT(S): 5000 INJECTION INTRAVENOUS; SUBCUTANEOUS at 10:13

## 2024-02-17 NOTE — PROGRESS NOTE ADULT - ASSESSMENT
39yo Female pt with PMHx bipolar II disorder, hypothyroidism, PSHx c-sections x2 (2019, 2023), who presents with x2 day hx of severe RUQ abdominal pain, with nausea and vomiting consistent with acute cholecystitis now s/p lap behzad.     Low Fat Diet/IVF   pain/nausea control PRN   holding Phentermine   Ceft/flagyl  SANJU  AM labs

## 2024-02-17 NOTE — PROGRESS NOTE ADULT - SUBJECTIVE AND OBJECTIVE BOX
2/17: dilaudid 1x for pain control. diet to low fat, pain regimine adjusted   ON: POC wnl, passed TOV, SANJU serosan  2/16: added on lap behzad, s/p lap behzad    POST-OP DAY: X s/p      SUBJECTIVE: Patient seen and examined bedside by Surgical resident.    cefTRIAXone   IVPB 1000 milliGRAM(s) IV Intermittent every 24 hours  heparin   Injectable 5000 Unit(s) SubCutaneous every 8 hours  metroNIDAZOLE  IVPB      metroNIDAZOLE  IVPB 500 milliGRAM(s) IV Intermittent every 8 hours    MEDICATIONS  (PRN):  HYDROmorphone  Injectable 0.25 milliGRAM(s) IV Push every 4 hours PRN Breakthrough pain only  ondansetron Injectable 4 milliGRAM(s) IV Push every 6 hours PRN Nausea and/or Vomiting  oxyCODONE    IR 5 milliGRAM(s) Oral every 6 hours PRN Severe Pain (7 - 10)  oxyCODONE    IR 2.5 milliGRAM(s) Oral every 6 hours PRN Moderate Pain (4 - 6)      I&O's Detail    16 Feb 2024 07:01  -  17 Feb 2024 07:00  --------------------------------------------------------  IN:  Total IN: 0 mL    OUT:    Bulb (mL): 145 mL    Voided (mL): 2050 mL  Total OUT: 2195 mL    Total NET: -2195 mL          Vital Signs Last 24 Hrs  T(C): 37.1 (17 Feb 2024 06:17), Max: 37.1 (17 Feb 2024 06:17)  T(F): 98.7 (17 Feb 2024 06:17), Max: 98.7 (17 Feb 2024 06:17)  HR: 73 (17 Feb 2024 06:17) (64 - 81)  BP: 138/77 (17 Feb 2024 06:17) (101/65 - 144/86)  BP(mean): 92 (16 Feb 2024 18:01) (85 - 97)  RR: 18 (17 Feb 2024 06:17) (14 - 18)  SpO2: 96% (17 Feb 2024 06:17) (94% - 98%)    Parameters below as of 16 Feb 2024 23:56  Patient On (Oxygen Delivery Method): room air        General: NAD, resting comfortably in bed  C/V: NSR  Pulm: Nonlabored breathing, no respiratory distress  Abd: soft, NT/ND  Extrem: WWP, no edema, SCDs in place    LABS:                        12.0   15.20 )-----------( 421      ( 17 Feb 2024 07:13 )             37.0     02-17    136  |  102  |  6<L>  ----------------------------<  93  4.3   |  21<L>  |  0.55    Ca    8.4      17 Feb 2024 07:13  Phos  3.4     02-17  Mg     2.0     02-17    TPro  6.9  /  Alb  3.5  /  TBili  1.7<H>  /  DBili  1.1<H>  /  AST  203<H>  /  ALT  220<H>  /  AlkPhos  172<H>  02-17    PT/INR - ( 16 Feb 2024 09:17 )   PT: 11.8 sec;   INR: 1.04          PTT - ( 16 Feb 2024 09:17 )  PTT:35.9 sec  Urinalysis Basic - ( 17 Feb 2024 07:13 )    Color: x / Appearance: x / SG: x / pH: x  Gluc: 93 mg/dL / Ketone: x  / Bili: x / Urobili: x   Blood: x / Protein: x / Nitrite: x   Leuk Esterase: x / RBC: x / WBC x   Sq Epi: x / Non Sq Epi: x / Bacteria: x        RADIOLOGY & ADDITIONAL STUDIES:      Culture - Body Fluid with Gram Stain (collected 02-16-24 @ 20:04)  Source: Bile None  Gram Stain (02-16-24 @ 21:52):    No organisms seen    No White blood cells  Preliminary Report (02-17-24 @ 08:11):    No growth to date    Urinalysis with Rflx Culture (collected 02-16-24 @ 09:17)     2/17: dilaudid 1x for pain control. diet to low fat, pain regimine adjusted   ON: POC wnl, passed TOV, SANJU serosan  2/16: added on lap behzad, s/p lap behzad    POST-OP DAY: 1 s/p lap behzad     SUBJECTIVE: Patient seen and examined bedside by Surgical resident. pt resting in bed this am, states pain is present but manageable at this time. tolerating PO intake with some abd pain when she drinks.      cefTRIAXone   IVPB 1000 milliGRAM(s) IV Intermittent every 24 hours  heparin   Injectable 5000 Unit(s) SubCutaneous every 8 hours  metroNIDAZOLE  IVPB      metroNIDAZOLE  IVPB 500 milliGRAM(s) IV Intermittent every 8 hours    MEDICATIONS  (PRN):  HYDROmorphone  Injectable 0.25 milliGRAM(s) IV Push every 4 hours PRN Breakthrough pain only  ondansetron Injectable 4 milliGRAM(s) IV Push every 6 hours PRN Nausea and/or Vomiting  oxyCODONE    IR 5 milliGRAM(s) Oral every 6 hours PRN Severe Pain (7 - 10)  oxyCODONE    IR 2.5 milliGRAM(s) Oral every 6 hours PRN Moderate Pain (4 - 6)      I&O's Detail    16 Feb 2024 07:01  -  17 Feb 2024 07:00  --------------------------------------------------------  IN:  Total IN: 0 mL    OUT:    Bulb (mL): 145 mL    Voided (mL): 2050 mL  Total OUT: 2195 mL    Total NET: -2195 mL          Vital Signs Last 24 Hrs  T(C): 37.1 (17 Feb 2024 06:17), Max: 37.1 (17 Feb 2024 06:17)  T(F): 98.7 (17 Feb 2024 06:17), Max: 98.7 (17 Feb 2024 06:17)  HR: 73 (17 Feb 2024 06:17) (64 - 81)  BP: 138/77 (17 Feb 2024 06:17) (101/65 - 144/86)  BP(mean): 92 (16 Feb 2024 18:01) (85 - 97)  RR: 18 (17 Feb 2024 06:17) (14 - 18)  SpO2: 96% (17 Feb 2024 06:17) (94% - 98%)    Parameters below as of 16 Feb 2024 23:56  Patient On (Oxygen Delivery Method): room air      Physical Exam:  General: NAD, resting comfortably in bed  Pulmonary: Nonlabored breathing, no respiratory distress  Cardiovascular: NSR  Abdominal: soft, ND, Appropriate incisional tenderness Incision clean, dry, and intact. SANJU drain serosang  Extremities: WWP, normal strength  Neuro: A/O x 3, CNs II-XII grossly intact      LABS:                        12.0   15.20 )-----------( 421      ( 17 Feb 2024 07:13 )             37.0     02-17    136  |  102  |  6<L>  ----------------------------<  93  4.3   |  21<L>  |  0.55    Ca    8.4      17 Feb 2024 07:13  Phos  3.4     02-17  Mg     2.0     02-17    TPro  6.9  /  Alb  3.5  /  TBili  1.7<H>  /  DBili  1.1<H>  /  AST  203<H>  /  ALT  220<H>  /  AlkPhos  172<H>  02-17    PT/INR - ( 16 Feb 2024 09:17 )   PT: 11.8 sec;   INR: 1.04          PTT - ( 16 Feb 2024 09:17 )  PTT:35.9 sec  Urinalysis Basic - ( 17 Feb 2024 07:13 )    Color: x / Appearance: x / SG: x / pH: x  Gluc: 93 mg/dL / Ketone: x  / Bili: x / Urobili: x   Blood: x / Protein: x / Nitrite: x   Leuk Esterase: x / RBC: x / WBC x   Sq Epi: x / Non Sq Epi: x / Bacteria: x        RADIOLOGY & ADDITIONAL STUDIES:      Culture - Body Fluid with Gram Stain (collected 02-16-24 @ 20:04)  Source: Bile None  Gram Stain (02-16-24 @ 21:52):    No organisms seen    No White blood cells  Preliminary Report (02-17-24 @ 08:11):    No growth to date    Urinalysis with Rflx Culture (collected 02-16-24 @ 09:17)

## 2024-02-17 NOTE — CONSULT NOTE ADULT - ASSESSMENT
38F with PMH of bipolar II disorder, hypothyroidism, PSHx c-sections x2 (2019, 2023), who presents with x2 day hx of severe RUQ abdominal pain, with nausea and vomiting consistent with acute cholecystitis now s/p lap behzad on 0216. GI consulted due to elevated bilirubin to 2.7 s/p lap behzad.     RUQ US: Cholelithiasis without acute signs of acute cholecystitis.    Recommendations;   - trend CBC, CMP, and INR daily   - f/u MRCP with and without IV contrast   - on ceftriaxone and flagyl; abx as per primary team     Case discussed with Dr. Trevizo. GI Team will continue to follow.     Laurence Ledezma D.O.   Gastroenterology Fellow  Weekday 7am-5pm Pager: [phone]  Weeknights/Weekend/Holiday Coverage: Please call the  for contact info    
38F with bipolar disorder, hypothyroidism who presented with RUQ abdominal pain, associated with nausea and vomiting. Presentation consistent with acute cholecystitis. She is now s/p laparoscopic cholecystectomy (2/16). Medicine is consulted for co-management.    #Acute cholecystitis. She is now s/p laparoscopic cholecystectomy (2/16)  #Post-operative state  - Pain/nausea control  - Broad spectrum abx  - Diet as tolerated  - OOB to ambulation  - Incentive spirometry  - DVT ppx as per primary team    #Hypothyroidism  - On levothyroxine, continue the same    #Bipolar disorder  - On Lamotrigine, continue the same

## 2024-02-17 NOTE — CONSULT NOTE ADULT - SUBJECTIVE AND OBJECTIVE BOX
Initial GI Consult Note:     HPI:  37yo Female pt with PMHx bipolar II disorder, hypothyroidism, PSHx c-sections x2 (, ), who presents with x2 day hx of severe RUQ abdominal pain, with nausea and vomiting.    Patient reports that she went to [place] x2 days ago and after she landed, she was eating unhealthy foods and started experiencing severe RUQ abdominal pain. She went to [facility] and was worked up and ultimately found to have symptomatic cholelithiasis on RUQ US, however with a WBC 20, and RUQ tenderness, more suspicious for early acute cholecystitis. She was given IV abx and was offered a cholecystectomy but preferred to be evaluated in [facility]. She reports that she has had an episode of nausea and vomiting x1 last month, but otherwise no similar symptoms.      In the ED, VS wnl. On exam, soft, ND, Mild ttp in RUQ and epigastrium. +[place]. No rebound or guarding. Labs significant for WBC 9 (20 yesterday at [facility]), Hgb 13.3, T bili 0.6, , AST//168. Abdominal US showing cholelithiasis without signs of acute cholecystitis, CBD measures up to 6mm.    PMH: bipolar II disorder, hypothyroidism  PSHx: c-sections x2 (2023)  Medications: lamictal 300mg, synthroid 75mcg, phentermine 74.5mg  Allergies: NKDA  Social Hx: denies  Family Hx: Denies family hx of IBS, Crohn's, UC, or colon cancer.  Last colonoscopy: never  Last EGD: never    Underwent cholecystectomy on , and found to have elevated bilirubin after procedure. GI consulted for elevated bilirubin and need for possible ERCP pending the results of MRCP. Patient seen and examined at bedside. States that overall she has persistent RUQ pain and still is experiencing some nausea and vomiting. Discussed plan for MRCP to further evaluate possibility of a retained stone. Per surgery team, SANJU drain with low to normal out-put.      Allergies  No Known Allergies  Intolerances      Home Medications:  acetaminophen 325 mg oral tablet: 3 tab(s) orally every 8 hours (03 Aug 2023 07:41)  ibuprofen 600 mg oral tablet: 1 tab(s) orally every 6 hours (03 Aug 2023 07:41)  lamoTRIgine 100 mg oral tablet: 1 tab(s) orally once a day (03 Aug 2023 07:41)  lamoTRIgine 200 mg oral tablet: 1 tab(s) orally once a day (03 Aug 2023 07:41)  levothyroxine 75 mcg (0.075 mg) oral tablet: 1 tab(s) orally once a day (03 Aug 2023 07:41)    MEDICATIONS:  MEDICATIONS  (STANDING):  acetaminophen     Tablet .. 650 milliGRAM(s) Oral every 6 hours  cefTRIAXone   IVPB 1000 milliGRAM(s) IV Intermittent every 24 hours  heparin   Injectable 5000 Unit(s) SubCutaneous every 8 hours  ibuprofen  Tablet. 600 milliGRAM(s) Oral every 6 hours  lamoTRIgine 300 milliGRAM(s) Oral daily  levothyroxine Injectable 60 MICROGram(s) IV Push at bedtime  metroNIDAZOLE  IVPB      metroNIDAZOLE  IVPB 500 milliGRAM(s) IV Intermittent every 8 hours    MEDICATIONS  (PRN):  HYDROmorphone  Injectable 0.25 milliGRAM(s) IV Push every 4 hours PRN Breakthrough pain only  ondansetron Injectable 4 milliGRAM(s) IV Push every 6 hours PRN Nausea and/or Vomiting  oxyCODONE    IR 5 milliGRAM(s) Oral every 6 hours PRN Severe Pain (7 - 10)  oxyCODONE    IR 2.5 milliGRAM(s) Oral every 6 hours PRN Moderate Pain (4 - 6)    PAST MEDICAL & SURGICAL HISTORY:  Bipolar 2 disorder      Hypothyroid      H/O  section        FAMILY HISTORY:    SOCIAL HISTORY:  [place]: [ ] Current, [ ] Former, [ ] Never; Pack Years:  Alcohol:  Illicit Drugs:      Vital Signs Last 24 Hrs  T(C): 36.6 (2024 20:47), Max: 37.1 (2024 06:17)  T(F): 97.8 (2024 20:47), Max: 98.7 (2024 06:17)  HR: 67 (2024 20:47) (65 - 73)  BP: 170/99 (2024 20:47) (138/77 - 170/99)  BP(mean): 112 (2024 18:00) (112 - 112)  RR: 18 (2024 20:47) (16 - 18)  SpO2: 98% (2024 20:47) (96% - 98%)    Parameters below as of 2024 20:47  Patient On (Oxygen Delivery Method): room air         @ 07:01  -   @ 07:00  --------------------------------------------------------  IN: 0 mL / OUT: 2195 mL / NET: -2195 mL     @ 07:01  -   @ 21:38  --------------------------------------------------------  IN: 600 mL / OUT: 1270 mL / NET: -670 mL      PHYSICAL EXAM:  General: Appears uncomfortable, basin at bedside due to n/v  Lungs: Normal respiratory effort and no intercostal retractions  Cardiovascular: RRR  Abdomen: RUQ tenderness, +SANJU drain intact   Neurological: Alert and oriented x3  Skin: Warm and dry. No obvious rash      LABS:                        12.0   15.20 )-----------( 421      ( 2024 07:13 )             37.0         137  |  101  |  5<L>  ----------------------------<  102<H>  3.5   |  24  |  0.55    Ca    8.3<L>      2024 15:28  Phos  3.4       Mg     2.0         TPro  7.0  /  Alb  3.8  /  TBili  2.7<H>  /  DBili  x   /  AST  336<H>  /  ALT  328<H>  /  AlkPhos  203<H>          PT/INR - ( 2024 09:17 )   PT: 11.8 sec;   INR: 1.04          PTT - ( 2024 09:17 )  PTT:35.9 sec    Culture - Body Fluid with Gram Stain (collected 2024 20:04)  Source: Bile None  Gram Stain (2024 21:52):    No organisms seen    No White blood cells  Preliminary Report (2024 08:11):    No growth to date    Urinalysis with Rflx Culture (collected 2024 09:17)      RADIOLOGY & ADDITIONAL STUDIES:     Reviewed
As per surgery HPI:  "39yo Female pt with PMHx bipolar II disorder, hypothyroidism, PSHx c-sections x2 (2019, 2023), who presents with x2 day hx of severe RUQ abdominal pain, with nausea and vomiting.    Patient reports that she went to [place] x2 days ago and after she landed, she was eating unhealthy foods and started experiencing severe RUQ abdominal pain. She went to [facility] and was worked up and ultimately found to have symptomatic cholelithiasis on RUQ US, however with a WBC 20, and RUQ tenderness, more suspicious for early acute cholecystitis. She was given IV abx and was offered a cholecystectomy but preferred to be evaluated in [facility]. She reports that she has had an episode of nausea and vomiting x1 last month, but otherwise no similar symptoms.      In the ED, VS wnl. On exam, soft, ND, Mild ttp in RUQ and epigastrium. +[place]. No rebound or guarding. Labs significant for WBC 9 (20 yesterday at [facility]), Hgb 13.3, T bili 0.6, , AST//168. Abdominal US showing cholelithiasis without signs of acute cholecystitis, CBD measures up to 6mm.    PMH: bipolar II disorder, hypothyroidism  PSHx: c-sections x2 (2019, 2023)  Medications: lamictal 300mg, synthroid 75mcg, phentermine 74.5mg  Allergies: NKDA  Social Hx: denies  Family Hx: Denies family hx of IBS, Crohn's, UC, or colon cancer.  Last colonoscopy: never  Last EGD: never"    Patient seen and examined at bedside. Complains of post-op abdominal pain that she reports has improved with her pain control regimen. ROS otherwise unremarkable.    Physical Exam  Vital Signs Last 24 Hrs  T(C): 36.7 (02-17-24 @ 10:39), Max: 37.1 (02-17-24 @ 06:17)  T(F): 98 (02-17-24 @ 10:39), Max: 98.7 (02-17-24 @ 06:17)  HR: 71 (02-17-24 @ 10:39) (64 - 81)  BP: 150/94 (02-17-24 @ 10:39) (101/65 - 150/94)  BP(mean): 92 (02-16-24 @ 18:01) (85 - 97)  RR: 17 (02-17-24 @ 10:39) (14 - 18)  SpO2: 97% (02-17-24 @ 10:39) (94% - 98%)  General: Young female in NAD  CV: RRR S1 S2  Lungs: CTABL no wheezing  Abd: Soft, NT/ND  Ext: No edema  Neuro: AAOx3, non-focal    LABS:                        12.0   15.20 )-----------( 421      ( 17 Feb 2024 07:13 )             37.0     02-17    136  |  102  |  6<L>  ----------------------------<  93  4.3   |  21<L>  |  0.55    Ca    8.4      17 Feb 2024 07:13  Phos  3.4     02-17  Mg     2.0     02-17    TPro  6.9  /  Alb  3.5  /  TBili  1.7<H>  /  DBili  1.1<H>  /  AST  203<H>  /  ALT  220<H>  /  AlkPhos  172<H>  02-17    LIVER FUNCTIONS - ( 17 Feb 2024 07:13 )  Alb: 3.5 g/dL / Pro: 6.9 g/dL / ALK PHOS: 172 U/L / ALT: 220 U/L / AST: 203 U/L / GGT: x           PT/INR - ( 16 Feb 2024 09:17 )   PT: 11.8 sec;   INR: 1.04          PTT - ( 16 Feb 2024 09:17 )  PTT:35.9 sec  CAPILLARY BLOOD GLUCOSE        Urinalysis Basic - ( 17 Feb 2024 07:13 )    Color: x / Appearance: x / SG: x / pH: x  Gluc: 93 mg/dL / Ketone: x  / Bili: x / Urobili: x   Blood: x / Protein: x / Nitrite: x   Leuk Esterase: x / RBC: x / WBC x   Sq Epi: x / Non Sq Epi: x / Bacteria: x        Culture - Body Fluid with Gram Stain (collected 16 Feb 2024 20:04)  Source: Bile None  Gram Stain (16 Feb 2024 21:52):    No organisms seen    No White blood cells  Preliminary Report (17 Feb 2024 08:11):    No growth to date    Urinalysis with Rflx Culture (collected 16 Feb 2024 09:17)        RADIOLOGY, EKG & ADDITIONAL TESTS: Reviewed.     MEDICATIONS  (STANDING):  acetaminophen     Tablet .. 650 milliGRAM(s) Oral every 6 hours  cefTRIAXone   IVPB 1000 milliGRAM(s) IV Intermittent every 24 hours  heparin   Injectable 5000 Unit(s) SubCutaneous every 8 hours  lamoTRIgine 300 milliGRAM(s) Oral daily  levothyroxine Injectable 60 MICROGram(s) IV Push at bedtime  metroNIDAZOLE  IVPB      metroNIDAZOLE  IVPB 500 milliGRAM(s) IV Intermittent every 8 hours  simethicone 80 milliGRAM(s) Chew once    MEDICATIONS  (PRN):  HYDROmorphone  Injectable 0.25 milliGRAM(s) IV Push every 4 hours PRN Breakthrough pain only  ondansetron Injectable 4 milliGRAM(s) IV Push every 6 hours PRN Nausea and/or Vomiting  oxyCODONE    IR 5 milliGRAM(s) Oral every 6 hours PRN Severe Pain (7 - 10)  oxyCODONE    IR 2.5 milliGRAM(s) Oral every 6 hours PRN Moderate Pain (4 - 6)

## 2024-02-18 LAB
ALBUMIN SERPL ELPH-MCNC: 3.5 G/DL — SIGNIFICANT CHANGE UP (ref 3.3–5)
ALP SERPL-CCNC: 251 U/L — HIGH (ref 40–120)
ALT FLD-CCNC: 377 U/L — HIGH (ref 10–45)
ANION GAP SERPL CALC-SCNC: 8 MMOL/L — SIGNIFICANT CHANGE UP (ref 5–17)
APTT BLD: 34.9 SEC — SIGNIFICANT CHANGE UP (ref 24.5–35.6)
AST SERPL-CCNC: 257 U/L — HIGH (ref 10–40)
BILIRUB SERPL-MCNC: 3.4 MG/DL — HIGH (ref 0.2–1.2)
BUN SERPL-MCNC: 5 MG/DL — LOW (ref 7–23)
CALCIUM SERPL-MCNC: 8.8 MG/DL — SIGNIFICANT CHANGE UP (ref 8.4–10.5)
CHLORIDE SERPL-SCNC: 103 MMOL/L — SIGNIFICANT CHANGE UP (ref 96–108)
CO2 SERPL-SCNC: 25 MMOL/L — SIGNIFICANT CHANGE UP (ref 22–31)
CREAT SERPL-MCNC: 0.53 MG/DL — SIGNIFICANT CHANGE UP (ref 0.5–1.3)
EGFR: 122 ML/MIN/1.73M2 — SIGNIFICANT CHANGE UP
GLUCOSE SERPL-MCNC: 92 MG/DL — SIGNIFICANT CHANGE UP (ref 70–99)
HCT VFR BLD CALC: 38.6 % — SIGNIFICANT CHANGE UP (ref 34.5–45)
HGB BLD-MCNC: 12.4 G/DL — SIGNIFICANT CHANGE UP (ref 11.5–15.5)
INR BLD: 1.08 — SIGNIFICANT CHANGE UP (ref 0.85–1.18)
MAGNESIUM SERPL-MCNC: 2.1 MG/DL — SIGNIFICANT CHANGE UP (ref 1.6–2.6)
MCHC RBC-ENTMCNC: 26.9 PG — LOW (ref 27–34)
MCHC RBC-ENTMCNC: 32.1 GM/DL — SIGNIFICANT CHANGE UP (ref 32–36)
MCV RBC AUTO: 83.7 FL — SIGNIFICANT CHANGE UP (ref 80–100)
NRBC # BLD: 0 /100 WBCS — SIGNIFICANT CHANGE UP (ref 0–0)
PHOSPHATE SERPL-MCNC: 3.4 MG/DL — SIGNIFICANT CHANGE UP (ref 2.5–4.5)
PLATELET # BLD AUTO: 352 K/UL — SIGNIFICANT CHANGE UP (ref 150–400)
POTASSIUM SERPL-MCNC: 4.4 MMOL/L — SIGNIFICANT CHANGE UP (ref 3.5–5.3)
POTASSIUM SERPL-SCNC: 4.4 MMOL/L — SIGNIFICANT CHANGE UP (ref 3.5–5.3)
PROT SERPL-MCNC: 7 G/DL — SIGNIFICANT CHANGE UP (ref 6–8.3)
PROTHROM AB SERPL-ACNC: 12.3 SEC — SIGNIFICANT CHANGE UP (ref 9.5–13)
RBC # BLD: 4.61 M/UL — SIGNIFICANT CHANGE UP (ref 3.8–5.2)
RBC # FLD: 15.1 % — HIGH (ref 10.3–14.5)
SODIUM SERPL-SCNC: 136 MMOL/L — SIGNIFICANT CHANGE UP (ref 135–145)
WBC # BLD: 9.91 K/UL — SIGNIFICANT CHANGE UP (ref 3.8–10.5)
WBC # FLD AUTO: 9.91 K/UL — SIGNIFICANT CHANGE UP (ref 3.8–10.5)

## 2024-02-18 PROCEDURE — 74183 MRI ABD W/O CNTR FLWD CNTR: CPT | Mod: 26

## 2024-02-18 PROCEDURE — 99233 SBSQ HOSP IP/OBS HIGH 50: CPT

## 2024-02-18 PROCEDURE — 99233 SBSQ HOSP IP/OBS HIGH 50: CPT | Mod: GC

## 2024-02-18 RX ORDER — SODIUM CHLORIDE 9 MG/ML
1000 INJECTION, SOLUTION INTRAVENOUS
Refills: 0 | Status: DISCONTINUED | OUTPATIENT
Start: 2024-02-18 | End: 2024-02-18

## 2024-02-18 RX ORDER — AMPICILLIN SODIUM AND SULBACTAM SODIUM 250; 125 MG/ML; MG/ML
3 INJECTION, POWDER, FOR SUSPENSION INTRAMUSCULAR; INTRAVENOUS EVERY 6 HOURS
Refills: 0 | Status: DISCONTINUED | OUTPATIENT
Start: 2024-02-18 | End: 2024-02-19

## 2024-02-18 RX ADMIN — HYDROMORPHONE HYDROCHLORIDE 0.25 MILLIGRAM(S): 2 INJECTION INTRAMUSCULAR; INTRAVENOUS; SUBCUTANEOUS at 17:45

## 2024-02-18 RX ADMIN — HYDROMORPHONE HYDROCHLORIDE 0.25 MILLIGRAM(S): 2 INJECTION INTRAMUSCULAR; INTRAVENOUS; SUBCUTANEOUS at 11:47

## 2024-02-18 RX ADMIN — LAMOTRIGINE 300 MILLIGRAM(S): 25 TABLET, ORALLY DISINTEGRATING ORAL at 17:31

## 2024-02-18 RX ADMIN — OXYCODONE HYDROCHLORIDE 5 MILLIGRAM(S): 5 TABLET ORAL at 17:30

## 2024-02-18 RX ADMIN — Medication 650 MILLIGRAM(S): at 17:31

## 2024-02-18 RX ADMIN — OXYCODONE HYDROCHLORIDE 5 MILLIGRAM(S): 5 TABLET ORAL at 06:45

## 2024-02-18 RX ADMIN — HEPARIN SODIUM 5000 UNIT(S): 5000 INJECTION INTRAVENOUS; SUBCUTANEOUS at 00:32

## 2024-02-18 RX ADMIN — Medication 600 MILLIGRAM(S): at 21:35

## 2024-02-18 RX ADMIN — CEFTRIAXONE 100 MILLIGRAM(S): 500 INJECTION, POWDER, FOR SOLUTION INTRAMUSCULAR; INTRAVENOUS at 11:47

## 2024-02-18 RX ADMIN — Medication 650 MILLIGRAM(S): at 01:02

## 2024-02-18 RX ADMIN — HYDROMORPHONE HYDROCHLORIDE 0.25 MILLIGRAM(S): 2 INJECTION INTRAMUSCULAR; INTRAVENOUS; SUBCUTANEOUS at 05:33

## 2024-02-18 RX ADMIN — Medication 600 MILLIGRAM(S): at 01:02

## 2024-02-18 RX ADMIN — Medication 100 MILLIGRAM(S): at 14:22

## 2024-02-18 RX ADMIN — HEPARIN SODIUM 5000 UNIT(S): 5000 INJECTION INTRAVENOUS; SUBCUTANEOUS at 09:42

## 2024-02-18 RX ADMIN — HYDROMORPHONE HYDROCHLORIDE 0.25 MILLIGRAM(S): 2 INJECTION INTRAMUSCULAR; INTRAVENOUS; SUBCUTANEOUS at 06:03

## 2024-02-18 RX ADMIN — Medication 600 MILLIGRAM(S): at 06:45

## 2024-02-18 RX ADMIN — AMPICILLIN SODIUM AND SULBACTAM SODIUM 200 GRAM(S): 250; 125 INJECTION, POWDER, FOR SUSPENSION INTRAMUSCULAR; INTRAVENOUS at 17:45

## 2024-02-18 RX ADMIN — OXYCODONE HYDROCHLORIDE 5 MILLIGRAM(S): 5 TABLET ORAL at 01:32

## 2024-02-18 RX ADMIN — SODIUM CHLORIDE 110 MILLILITER(S): 9 INJECTION, SOLUTION INTRAVENOUS at 10:48

## 2024-02-18 RX ADMIN — Medication 650 MILLIGRAM(S): at 06:46

## 2024-02-18 RX ADMIN — ONDANSETRON 4 MILLIGRAM(S): 8 TABLET, FILM COATED ORAL at 10:51

## 2024-02-18 RX ADMIN — OXYCODONE HYDROCHLORIDE 5 MILLIGRAM(S): 5 TABLET ORAL at 16:30

## 2024-02-18 RX ADMIN — OXYCODONE HYDROCHLORIDE 5 MILLIGRAM(S): 5 TABLET ORAL at 00:32

## 2024-02-18 RX ADMIN — HYDROMORPHONE HYDROCHLORIDE 0.25 MILLIGRAM(S): 2 INJECTION INTRAMUSCULAR; INTRAVENOUS; SUBCUTANEOUS at 12:02

## 2024-02-18 RX ADMIN — HEPARIN SODIUM 5000 UNIT(S): 5000 INJECTION INTRAVENOUS; SUBCUTANEOUS at 17:31

## 2024-02-18 RX ADMIN — HYDROMORPHONE HYDROCHLORIDE 0.25 MILLIGRAM(S): 2 INJECTION INTRAMUSCULAR; INTRAVENOUS; SUBCUTANEOUS at 17:30

## 2024-02-18 RX ADMIN — OXYCODONE HYDROCHLORIDE 5 MILLIGRAM(S): 5 TABLET ORAL at 07:45

## 2024-02-18 RX ADMIN — Medication 100 MILLIGRAM(S): at 06:45

## 2024-02-18 NOTE — PROGRESS NOTE ADULT - SUBJECTIVE AND OBJECTIVE BOX
No acute events overnight. Patient seen and examined at bedside. Complains of post-op abdominal pain that she reports has improved with her pain control regimen. ROS otherwise unremarkable.    Vital Signs Last 24 Hrs  T(C): 36.8 (02-18-24 @ 08:37), Max: 37 (02-17-24 @ 18:00)  T(F): 98.3 (02-18-24 @ 08:37), Max: 98.6 (02-17-24 @ 18:00)  HR: 71 (02-18-24 @ 08:37) (65 - 74)  BP: 115/74 (02-18-24 @ 08:37) (105/90 - 170/99)  BP(mean): 112 (02-17-24 @ 18:00) (112 - 112)  RR: 17 (02-18-24 @ 08:37) (17 - 18)  SpO2: 95% (02-18-24 @ 08:37) (95% - 98%)    Physical Exam  General: Young female in NAD  CV: RRR S1 S2  Lungs: CTABL no wheezing  Abd: Soft, NT/ND +SANJU drain with serosanguinous fluid  Ext: No edema  Neuro: AAOx3, non-focal    LABS:                        12.4   9.91  )-----------( 352      ( 18 Feb 2024 05:30 )             38.6     02-18    136  |  103  |  5<L>  ----------------------------<  92  4.4   |  25  |  0.53    Ca    8.8      18 Feb 2024 05:30  Phos  3.4     02-18  Mg     2.1     02-18    TPro  7.0  /  Alb  3.5  /  TBili  3.4<H>  /  DBili  x   /  AST  257<H>  /  ALT  377<H>  /  AlkPhos  251<H>  02-18    LIVER FUNCTIONS - ( 18 Feb 2024 05:30 )  Alb: 3.5 g/dL / Pro: 7.0 g/dL / ALK PHOS: 251 U/L / ALT: 377 U/L / AST: 257 U/L / GGT: x           PT/INR - ( 18 Feb 2024 05:30 )   PT: 12.3 sec;   INR: 1.08          PTT - ( 18 Feb 2024 05:30 )  PTT:34.9 sec  CAPILLARY BLOOD GLUCOSE        Urinalysis Basic - ( 18 Feb 2024 05:30 )    Color: x / Appearance: x / SG: x / pH: x  Gluc: 92 mg/dL / Ketone: x  / Bili: x / Urobili: x   Blood: x / Protein: x / Nitrite: x   Leuk Esterase: x / RBC: x / WBC x   Sq Epi: x / Non Sq Epi: x / Bacteria: x        Culture - Body Fluid with Gram Stain (collected 16 Feb 2024 20:04)  Source: Bile None  Gram Stain (16 Feb 2024 21:52):    No organisms seen    No White blood cells  Preliminary Report (17 Feb 2024 08:11):    No growth to date    Urinalysis with Rflx Culture (collected 16 Feb 2024 09:17)        RADIOLOGY, EKG & ADDITIONAL TESTS: Reviewed.     MEDICATIONS  (STANDING):  acetaminophen     Tablet .. 650 milliGRAM(s) Oral every 6 hours  cefTRIAXone   IVPB 1000 milliGRAM(s) IV Intermittent every 24 hours  dextrose 5% + sodium chloride 0.45%. 1000 milliLiter(s) (110 mL/Hr) IV Continuous <Continuous>  heparin   Injectable 5000 Unit(s) SubCutaneous every 8 hours  ibuprofen  Tablet. 600 milliGRAM(s) Oral every 6 hours  lamoTRIgine 300 milliGRAM(s) Oral daily  levothyroxine Injectable 60 MICROGram(s) IV Push at bedtime  metroNIDAZOLE  IVPB      metroNIDAZOLE  IVPB 500 milliGRAM(s) IV Intermittent every 8 hours    MEDICATIONS  (PRN):  HYDROmorphone  Injectable 0.25 milliGRAM(s) IV Push every 4 hours PRN Breakthrough pain only  ondansetron Injectable 4 milliGRAM(s) IV Push every 6 hours PRN Nausea and/or Vomiting  oxyCODONE    IR 5 milliGRAM(s) Oral every 6 hours PRN Severe Pain (7 - 10)  oxyCODONE    IR 2.5 milliGRAM(s) Oral every 6 hours PRN Moderate Pain (4 - 6)

## 2024-02-18 NOTE — PROGRESS NOTE ADULT - SUBJECTIVE AND OBJECTIVE BOX
INTERVAL HPI/OVERNIGHT EVENTS: pain present, +po liquids    STATUS POST:  laparoscopic cholecystectomy with SANJU drain     POST OPERATIVE DAY #: 2    SUBJECTIVE: Patient seen and examined at bedside with chief resident.      cefTRIAXone   IVPB 1000 milliGRAM(s) IV Intermittent every 24 hours  heparin   Injectable 5000 Unit(s) SubCutaneous every 8 hours  metroNIDAZOLE  IVPB      metroNIDAZOLE  IVPB 500 milliGRAM(s) IV Intermittent every 8 hours      Vital Signs Last 24 Hrs  T(C): 36.6 (18 Feb 2024 05:18), Max: 37 (17 Feb 2024 18:00)  T(F): 97.8 (18 Feb 2024 05:18), Max: 98.6 (17 Feb 2024 18:00)  HR: 74 (18 Feb 2024 05:18) (65 - 74)  BP: 116/77 (18 Feb 2024 05:18) (105/90 - 170/99)  BP(mean): 112 (17 Feb 2024 18:00) (112 - 112)  RR: 18 (18 Feb 2024 05:18) (17 - 18)  SpO2: 96% (18 Feb 2024 05:18) (96% - 98%)    Parameters below as of 18 Feb 2024 05:18  Patient On (Oxygen Delivery Method): room air      I&O's Detail    16 Feb 2024 07:01  -  17 Feb 2024 07:00  --------------------------------------------------------  IN:  Total IN: 0 mL    OUT:    Bulb (mL): 145 mL    Voided (mL): 2050 mL  Total OUT: 2195 mL    Total NET: -2195 mL      17 Feb 2024 07:01  -  18 Feb 2024 06:47  --------------------------------------------------------  IN:    IV PiggyBack: 100 mL    IV PiggyBack: 50 mL    Oral Fluid: 450 mL  Total IN: 600 mL    OUT:    Bulb (mL): 190 mL    Voided (mL): 1600 mL  Total OUT: 1790 mL    Total NET: -1190 mL          General: NAD, resting comfortably in bed  C/V: NSR  Pulm: Nonlabored breathing, no respiratory distress  Abd: soft, NT/ND.  Extrem: WWP, no edema, SCDs in place  Drains:  Tsai:      LABS:                        12.0   15.20 )-----------( 421      ( 17 Feb 2024 07:13 )             37.0     02-17    137  |  101  |  5<L>  ----------------------------<  102<H>  3.5   |  24  |  0.55    Ca    8.3<L>      17 Feb 2024 15:28  Phos  3.4     02-17  Mg     2.0     02-17    TPro  7.0  /  Alb  3.8  /  TBili  2.7<H>  /  DBili  x   /  AST  336<H>  /  ALT  328<H>  /  AlkPhos  203<H>  02-17    PT/INR - ( 16 Feb 2024 09:17 )   PT: 11.8 sec;   INR: 1.04          PTT - ( 16 Feb 2024 09:17 )  PTT:35.9 sec  Urinalysis Basic - ( 17 Feb 2024 15:28 )    Color: x / Appearance: x / SG: x / pH: x  Gluc: 102 mg/dL / Ketone: x  / Bili: x / Urobili: x   Blood: x / Protein: x / Nitrite: x   Leuk Esterase: x / RBC: x / WBC x   Sq Epi: x / Non Sq Epi: x / Bacteria: x        RADIOLOGY & ADDITIONAL STUDIES:   INTERVAL HPI/OVERNIGHT EVENTS: pain present, +po liquids    STATUS POST:  laparoscopic cholecystectomy with SANJU drain     POST OPERATIVE DAY #: 2    SUBJECTIVE: Patient seen and examined at bedside with chief resident. Patient seen and examined at bedside with chief resident. Patient states that her abdominal pain has been controlled with medication. She denies nausea and vomiting.       cefTRIAXone   IVPB 1000 milliGRAM(s) IV Intermittent every 24 hours  heparin   Injectable 5000 Unit(s) SubCutaneous every 8 hours  metroNIDAZOLE  IVPB      metroNIDAZOLE  IVPB 500 milliGRAM(s) IV Intermittent every 8 hours      Vital Signs Last 24 Hrs  T(C): 36.6 (18 Feb 2024 05:18), Max: 37 (17 Feb 2024 18:00)  T(F): 97.8 (18 Feb 2024 05:18), Max: 98.6 (17 Feb 2024 18:00)  HR: 74 (18 Feb 2024 05:18) (65 - 74)  BP: 116/77 (18 Feb 2024 05:18) (105/90 - 170/99)  BP(mean): 112 (17 Feb 2024 18:00) (112 - 112)  RR: 18 (18 Feb 2024 05:18) (17 - 18)  SpO2: 96% (18 Feb 2024 05:18) (96% - 98%)    Parameters below as of 18 Feb 2024 05:18  Patient On (Oxygen Delivery Method): room air      I&O's Detail    16 Feb 2024 07:01  -  17 Feb 2024 07:00  --------------------------------------------------------  IN:  Total IN: 0 mL    OUT:    Bulb (mL): 145 mL    Voided (mL): 2050 mL  Total OUT: 2195 mL    Total NET: -2195 mL      17 Feb 2024 07:01  -  18 Feb 2024 06:47  --------------------------------------------------------  IN:    IV PiggyBack: 100 mL    IV PiggyBack: 50 mL    Oral Fluid: 450 mL  Total IN: 600 mL    OUT:    Bulb (mL): 190 mL    Voided (mL): 1600 mL  Total OUT: 1790 mL    Total NET: -1190 mL          General: NAD, resting comfortably in bed, mild jaundice  C/V: NSR  Pulm: Nonlabored breathing, no respiratory distress  Abd: soft, appropriately tender to palpation, incisions clean/dry/intact   Extrem: WWP, no edema, SCDs in place  Drains: SANJU with serosang      LABS:                        12.0   15.20 )-----------( 421      ( 17 Feb 2024 07:13 )             37.0     02-17    137  |  101  |  5<L>  ----------------------------<  102<H>  3.5   |  24  |  0.55    Ca    8.3<L>      17 Feb 2024 15:28  Phos  3.4     02-17  Mg     2.0     02-17    TPro  7.0  /  Alb  3.8  /  TBili  2.7<H>  /  DBili  x   /  AST  336<H>  /  ALT  328<H>  /  AlkPhos  203<H>  02-17    PT/INR - ( 16 Feb 2024 09:17 )   PT: 11.8 sec;   INR: 1.04          PTT - ( 16 Feb 2024 09:17 )  PTT:35.9 sec  Urinalysis Basic - ( 17 Feb 2024 15:28 )    Color: x / Appearance: x / SG: x / pH: x  Gluc: 102 mg/dL / Ketone: x  / Bili: x / Urobili: x   Blood: x / Protein: x / Nitrite: x   Leuk Esterase: x / RBC: x / WBC x   Sq Epi: x / Non Sq Epi: x / Bacteria: x        RADIOLOGY & ADDITIONAL STUDIES:

## 2024-02-18 NOTE — PROGRESS NOTE ADULT - SUBJECTIVE AND OBJECTIVE BOX
GI Consult Progress Note:     OVERNIGHT EVENTS: JOSE.    SUBJECTIVE / INTERVAL HPI:   Patient seen and examined at bedside. States that overall she feels better today than yesterday. Discussed MRCP findings with patient and the fact that there is no need for ERCP at this time.       VITAL SIGNS:  Vital Signs Last 24 Hrs  T(C): 36.5 (18 Feb 2024 16:12), Max: 37 (17 Feb 2024 18:00)  T(F): 97.7 (18 Feb 2024 16:12), Max: 98.6 (17 Feb 2024 18:00)  HR: 75 (18 Feb 2024 16:12) (66 - 75)  BP: 124/83 (18 Feb 2024 16:12) (105/90 - 170/99)  BP(mean): 112 (17 Feb 2024 18:00) (112 - 112)  RR: 17 (18 Feb 2024 16:12) (12 - 18)  SpO2: 98% (18 Feb 2024 16:12) (95% - 98%)    Parameters below as of 18 Feb 2024 16:12  Patient On (Oxygen Delivery Method): room air        02-17-24 @ 07:01  -  02-18-24 @ 07:00  --------------------------------------------------------  IN: 600 mL / OUT: 2640 mL / NET: -2040 mL    02-18-24 @ 07:01  - 02-18-24 @ 17:42  --------------------------------------------------------  IN: 215 mL / OUT: 825 mL / NET: -610 mL        PHYSICAL EXAM:  General: No acute distress  Lungs: Normal respiratory effort and no intercostal retractions  Cardiovascular: RRR  Abdomen: Soft, non-distended, tenderness improving, +SANJU drain draining serosanguinous fluid   Neurological: Alert and oriented x3  Skin: Warm and dry. No obvious rash      MEDICATIONS:  MEDICATIONS  (STANDING):  acetaminophen     Tablet .. 650 milliGRAM(s) Oral every 6 hours  ampicillin/sulbactam  IVPB 3 Gram(s) IV Intermittent every 6 hours  heparin   Injectable 5000 Unit(s) SubCutaneous every 8 hours  ibuprofen  Tablet. 600 milliGRAM(s) Oral every 6 hours  lamoTRIgine 300 milliGRAM(s) Oral daily  levothyroxine Injectable 60 MICROGram(s) IV Push at bedtime    MEDICATIONS  (PRN):  HYDROmorphone  Injectable 0.25 milliGRAM(s) IV Push every 4 hours PRN Breakthrough pain only  ondansetron Injectable 4 milliGRAM(s) IV Push every 6 hours PRN Nausea and/or Vomiting  oxyCODONE    IR 5 milliGRAM(s) Oral every 6 hours PRN Severe Pain (7 - 10)  oxyCODONE    IR 2.5 milliGRAM(s) Oral every 6 hours PRN Moderate Pain (4 - 6)      ALLERGIES:  Allergies    No Known Allergies    Intolerances        LABS:                        12.4   9.91  )-----------( 352      ( 18 Feb 2024 05:30 )             38.6     02-18    136  |  103  |  5<L>  ----------------------------<  92  4.4   |  25  |  0.53    Ca    8.8      18 Feb 2024 05:30  Phos  3.4     02-18  Mg     2.1     02-18    TPro  7.0  /  Alb  3.5  /  TBili  3.4<H>  /  DBili  x   /  AST  257<H>  /  ALT  377<H>  /  AlkPhos  251<H>  02-18    PT/INR - ( 18 Feb 2024 05:30 )   PT: 12.3 sec;   INR: 1.08          PTT - ( 18 Feb 2024 05:30 )  PTT:34.9 sec  Urinalysis Basic - ( 18 Feb 2024 05:30 )    Color: x / Appearance: x / SG: x / pH: x  Gluc: 92 mg/dL / Ketone: x  / Bili: x / Urobili: x   Blood: x / Protein: x / Nitrite: x   Leuk Esterase: x / RBC: x / WBC x   Sq Epi: x / Non Sq Epi: x / Bacteria: x      CAPILLARY BLOOD GLUCOSE  RADIOLOGY & ADDITIONAL TESTS: Reviewed.

## 2024-02-18 NOTE — PATIENT PROFILE ADULT - FALL HARM RISK - HARM RISK INTERVENTIONS

## 2024-02-18 NOTE — PROGRESS NOTE ADULT - ASSESSMENT
38F with PMH of bipolar II disorder, hypothyroidism, PSHx c-sections x2 (2019, 2023), who presents with x2 day hx of severe RUQ abdominal pain, with nausea and vomiting consistent with acute cholecystitis now s/p lap behzad on 0216. GI consulted due to elevated bilirubin to 2.7 s/p lap behzad.     RUQ US: Cholelithiasis without acute signs of acute cholecystitis.  MRCP: No biliary dilatation or choledocholithiasis status post cholecystectomy.    Suspect elevations in bilirubin, alk phos, and liver enzymes possibly 2/2 DILI (Augmentin and Ceftriaxone) as MRCP did not identify any obstructive pathology at this time. Also possible that patient has passed a gallstone, and as a result, has lingering elevations in a cholestatic pattern.     Recommendations;   - trend CBC, CMP, and INR daily with AM labs   - avoid further use of ceftriaxone and augmentin   - agree with switching to Unasyn at this time   - no plans for ERCP currently, so can advance patient to low fat diet if okay with primary team   - plan to discuss case with hepatology attending as well     Case discussed with Dr. Trevizo. GI Team will continue to follow.     Laurence Ledezma D.O.   Gastroenterology Fellow  Weekday 7am-5pm Pager: [phone]  Weeknights/Weekend/Holiday Coverage: Please call the  for contact  38F with PMH of bipolar II disorder, hypothyroidism, PSHx c-sections x2 (2019, 2023), who presents with x2 day hx of severe RUQ abdominal pain, with nausea and vomiting consistent with acute cholecystitis now s/p lap behzad on 0216. GI consulted due to elevated bilirubin to 2.7 s/p lap behzad.     RUQ US: Cholelithiasis without acute signs of acute cholecystitis.  MRCP: No biliary dilatation or choledocholithiasis status post cholecystectomy.    Suspect elevations in bilirubin, alk phos, and liver enzymes possibly 2/2 DILI (Augmentin and Ceftriaxone) as MRCP did not identify any obstructive pathology at this time. Also possible that patient has passed a gallstone, and as a result, has lingering elevations in a cholestatic pattern. Case reviewed with Hepatology Team as well.     Recommendations;   - trend CBC, CMP, and INR daily with AM labs   - avoid further use of ceftriaxone and Augmentin as suspect DILI   - agree with switching to Unasyn at this time   - no plans for ERCP currently, so can advance patient to low fat diet if okay with primary team   - please send acute hepatitis panel   - in addition to acute hepatitis panel, please send HAV IgG, HBV surface Ab, and HBV core total Ab  - please send Hep B PCR, Hep A PCR, and Hep C PCR  (order as 'Hepatitis C Virus RNA Detection by PCR')   - please send SWAPNIL, AMA, ASMA, IgG, IgM, alpha-1 anti-trypsin, and ceruloplasmin     Case discussed with Dr. Trevizo and Dr. Medeiros. GI Team will continue to follow.     Laurence Ledezma D.O.   Gastroenterology Fellow  Weekday 7am-5pm Pager: [phone]  Weeknights/Weekend/Holiday Coverage: Please call the  for contact

## 2024-02-18 NOTE — PROGRESS NOTE ADULT - ASSESSMENT
37yo Female pt with PMHx bipolar II disorder, hypothyroidism, PSHx c-sections x2 (2019, 2023), who presents with x2 day hx of severe RUQ abdominal pain, with nausea and vomiting consistent with acute cholecystitis now s/p lap behzad.     Low Fat Diet NPO@MN/IVF   pain/nausea control PRN   holding Phentermine   Ceft/flagyl  SANJU  AM labs  Pending MRCP

## 2024-02-18 NOTE — PROGRESS NOTE ADULT - ASSESSMENT
38F with bipolar disorder, hypothyroidism who presented with RUQ abdominal pain, associated with nausea and vomiting. Presentation consistent with acute cholecystitis. She is now s/p laparoscopic cholecystectomy (2/16). Medicine is consulted for co-management.    #Acute cholecystitis s/p laparoscopic cholecystectomy (2/16)  #Post-operative state  - Pain/nausea control  - Broad spectrum abx  - Diet as tolerated  - OOB to ambulation  - Incentive spirometry  - DVT ppx as per primary team    #Transaminitis with hyperbilirubinemia  - LFTs trending up post op  - GI consulted by primary team  - Plan for MRCP today and possible ERCP (possible retained stone post-op)    #Hypothyroidism  - On levothyroxine, continue the same    #Bipolar disorder  - On Lamotrigine, continue the same

## 2024-02-18 NOTE — PATIENT PROFILE ADULT - FUNCTIONAL ASSESSMENT - BASIC MOBILITY SCORE.
Daily Note     Today's date: 2023  Patient name: Libia Conner  : 1954  MRN: [id number]  Referring provider: Buddy Champion DO  Dx:   Encounter Diagnosis     ICD-10-CM    1  Parkinson disease ([facility] [address] )  G20       2  Gait abnormality  R26 9           Start Time: 1605          Subjective: Patient with no new complaints, arrives with L wrist brace on  Arrives 20 min late due to traffic  Objective: See treatment diary below    Warm up:  - Seated large amplitude floor to ceiling x 10  - Seated twist and reach x 10 B/L    Shadow boxing x 20 each  - 1's  - 2's  - 3's  - 4's  - 5's  - 6's    Circuit 1 (2 minutes, 1 round): - Large amplitude FWD stepping over single 9" kayden > shadow burnouts  - Large amplitude sidestepping over single 9" kayden > shadow burnouts  - Large amplitude backward step over 9" kayden > shadow burnouts     Circuit 2 (2 minutes, 1 round):  - Reaching across midline to tap blaze pod  - Rock and reach standing on foam beams    Cool down  - OH tricep stretch  - Arm across the chest  - Calf stretch      Assessment: Patient tolerated treatment session well today  Session shortened due to pt arriving late  Difficulty coordinating UE and LE movements during rock and reach  Otherwise demonstrates excellent large amplitude movements  Patient will continue to benefit from skilled OPPT services to maximize functional mobility and slow disease progression secondary to PD diagnosis  Plan: Continue per plan of care  Progress treatment as tolerated  POC expires Auth Status Total   Visits  Start date  Expiration date PT/OT + Visit Limit?  Co-Insurance   23 Auth not  Visits   PT $20 Co-pay                                           Visit/Unit Tracking  AUTH Status: Submitted Date            Visits  Authed: Submitted Used eval 2 3 4            Remaining 18

## 2024-02-19 ENCOUNTER — TRANSCRIPTION ENCOUNTER (OUTPATIENT)
Age: 39
End: 2024-02-19

## 2024-02-19 VITALS
DIASTOLIC BLOOD PRESSURE: 65 MMHG | TEMPERATURE: 98 F | HEART RATE: 72 BPM | RESPIRATION RATE: 17 BRPM | SYSTOLIC BLOOD PRESSURE: 117 MMHG | OXYGEN SATURATION: 97 %

## 2024-02-19 LAB
A1AT SERPL-MCNC: 195 MG/DL — SIGNIFICANT CHANGE UP (ref 90–200)
ALBUMIN SERPL ELPH-MCNC: 3.7 G/DL — SIGNIFICANT CHANGE UP (ref 3.3–5)
ALP SERPL-CCNC: 266 U/L — HIGH (ref 40–120)
ALT FLD-CCNC: 233 U/L — HIGH (ref 10–45)
ANION GAP SERPL CALC-SCNC: 9 MMOL/L — SIGNIFICANT CHANGE UP (ref 5–17)
AST SERPL-CCNC: 84 U/L — HIGH (ref 10–40)
BILIRUB DIRECT SERPL-MCNC: 0.4 MG/DL — HIGH (ref 0–0.3)
BILIRUB INDIRECT FLD-MCNC: 0.4 MG/DL — SIGNIFICANT CHANGE UP (ref 0.2–1)
BILIRUB SERPL-MCNC: 0.8 MG/DL — SIGNIFICANT CHANGE UP (ref 0.2–1.2)
BUN SERPL-MCNC: 10 MG/DL — SIGNIFICANT CHANGE UP (ref 7–23)
CALCIUM SERPL-MCNC: 8.7 MG/DL — SIGNIFICANT CHANGE UP (ref 8.4–10.5)
CERULOPLASMIN SERPL-MCNC: 31 MG/DL — SIGNIFICANT CHANGE UP (ref 16–45)
CHLORIDE SERPL-SCNC: 102 MMOL/L — SIGNIFICANT CHANGE UP (ref 96–108)
CO2 SERPL-SCNC: 26 MMOL/L — SIGNIFICANT CHANGE UP (ref 22–31)
CREAT SERPL-MCNC: 0.5 MG/DL — SIGNIFICANT CHANGE UP (ref 0.5–1.3)
EGFR: 124 ML/MIN/1.73M2 — SIGNIFICANT CHANGE UP
GLUCOSE SERPL-MCNC: 102 MG/DL — HIGH (ref 70–99)
HAV IGG SER QL IA: REACTIVE
HAV IGM SER-ACNC: SIGNIFICANT CHANGE UP
HBV CORE AB SER-ACNC: SIGNIFICANT CHANGE UP
HBV CORE IGM SER-ACNC: SIGNIFICANT CHANGE UP
HBV DNA # SERPL NAA+PROBE: SIGNIFICANT CHANGE UP
HBV DNA SERPL NAA+PROBE-LOG#: SIGNIFICANT CHANGE UP LOGIU/ML
HBV SURFACE AB SER-ACNC: REACTIVE
HBV SURFACE AG SER-ACNC: SIGNIFICANT CHANGE UP
HCT VFR BLD CALC: 39.4 % — SIGNIFICANT CHANGE UP (ref 34.5–45)
HCV AB S/CO SERPL IA: 0.04 S/CO — SIGNIFICANT CHANGE UP
HCV AB SERPL-IMP: SIGNIFICANT CHANGE UP
HCV RNA SPEC NAA+PROBE-LOG IU: SIGNIFICANT CHANGE UP
HCV RNA SPEC NAA+PROBE-LOG IU: SIGNIFICANT CHANGE UP LOGIU/ML
HGB BLD-MCNC: 12.4 G/DL — SIGNIFICANT CHANGE UP (ref 11.5–15.5)
INR BLD: 1.05 — SIGNIFICANT CHANGE UP (ref 0.85–1.18)
MAGNESIUM SERPL-MCNC: 1.9 MG/DL — SIGNIFICANT CHANGE UP (ref 1.6–2.6)
MCHC RBC-ENTMCNC: 27 PG — SIGNIFICANT CHANGE UP (ref 27–34)
MCHC RBC-ENTMCNC: 31.5 GM/DL — LOW (ref 32–36)
MCV RBC AUTO: 85.8 FL — SIGNIFICANT CHANGE UP (ref 80–100)
NRBC # BLD: 0 /100 WBCS — SIGNIFICANT CHANGE UP (ref 0–0)
PHOSPHATE SERPL-MCNC: 3.2 MG/DL — SIGNIFICANT CHANGE UP (ref 2.5–4.5)
PLATELET # BLD AUTO: 340 K/UL — SIGNIFICANT CHANGE UP (ref 150–400)
POTASSIUM SERPL-MCNC: 3.6 MMOL/L — SIGNIFICANT CHANGE UP (ref 3.5–5.3)
POTASSIUM SERPL-SCNC: 3.6 MMOL/L — SIGNIFICANT CHANGE UP (ref 3.5–5.3)
PROT SERPL-MCNC: 7.1 G/DL — SIGNIFICANT CHANGE UP (ref 6–8.3)
PROTHROM AB SERPL-ACNC: 12 SEC — SIGNIFICANT CHANGE UP (ref 9.5–13)
RBC # BLD: 4.59 M/UL — SIGNIFICANT CHANGE UP (ref 3.8–5.2)
RBC # FLD: 14.9 % — HIGH (ref 10.3–14.5)
SODIUM SERPL-SCNC: 137 MMOL/L — SIGNIFICANT CHANGE UP (ref 135–145)
WBC # BLD: 10.32 K/UL — SIGNIFICANT CHANGE UP (ref 3.8–10.5)
WBC # FLD AUTO: 10.32 K/UL — SIGNIFICANT CHANGE UP (ref 3.8–10.5)

## 2024-02-19 PROCEDURE — 84100 ASSAY OF PHOSPHORUS: CPT

## 2024-02-19 PROCEDURE — 80053 COMPREHEN METABOLIC PANEL: CPT

## 2024-02-19 PROCEDURE — 87075 CULTR BACTERIA EXCEPT BLOOD: CPT

## 2024-02-19 PROCEDURE — 88304 TISSUE EXAM BY PATHOLOGIST: CPT

## 2024-02-19 PROCEDURE — 82784 ASSAY IGA/IGD/IGG/IGM EACH: CPT

## 2024-02-19 PROCEDURE — 82390 ASSAY OF CERULOPLASMIN: CPT

## 2024-02-19 PROCEDURE — 80074 ACUTE HEPATITIS PANEL: CPT

## 2024-02-19 PROCEDURE — 99233 SBSQ HOSP IP/OBS HIGH 50: CPT | Mod: GC

## 2024-02-19 PROCEDURE — A9585: CPT

## 2024-02-19 PROCEDURE — 86381 MITOCHONDRIAL ANTIBODY EACH: CPT

## 2024-02-19 PROCEDURE — 86706 HEP B SURFACE ANTIBODY: CPT

## 2024-02-19 PROCEDURE — 84702 CHORIONIC GONADOTROPIN TEST: CPT

## 2024-02-19 PROCEDURE — 99232 SBSQ HOSP IP/OBS MODERATE 35: CPT

## 2024-02-19 PROCEDURE — 76705 ECHO EXAM OF ABDOMEN: CPT

## 2024-02-19 PROCEDURE — 87205 SMEAR GRAM STAIN: CPT

## 2024-02-19 PROCEDURE — 96374 THER/PROPH/DIAG INJ IV PUSH: CPT

## 2024-02-19 PROCEDURE — 99285 EMERGENCY DEPT VISIT HI MDM: CPT | Mod: 25

## 2024-02-19 PROCEDURE — 82103 ALPHA-1-ANTITRYPSIN TOTAL: CPT

## 2024-02-19 PROCEDURE — 87517 HEPATITIS B DNA QUANT: CPT

## 2024-02-19 PROCEDURE — 80048 BASIC METABOLIC PNL TOTAL CA: CPT

## 2024-02-19 PROCEDURE — 83690 ASSAY OF LIPASE: CPT

## 2024-02-19 PROCEDURE — 86850 RBC ANTIBODY SCREEN: CPT

## 2024-02-19 PROCEDURE — 80076 HEPATIC FUNCTION PANEL: CPT

## 2024-02-19 PROCEDURE — 86901 BLOOD TYPING SEROLOGIC RH(D): CPT

## 2024-02-19 PROCEDURE — 36415 COLL VENOUS BLD VENIPUNCTURE: CPT

## 2024-02-19 PROCEDURE — 87522 HEPATITIS C REVRS TRNSCRPJ: CPT

## 2024-02-19 PROCEDURE — 87070 CULTURE OTHR SPECIMN AEROBIC: CPT

## 2024-02-19 PROCEDURE — 85730 THROMBOPLASTIN TIME PARTIAL: CPT

## 2024-02-19 PROCEDURE — 85025 COMPLETE CBC W/AUTO DIFF WBC: CPT

## 2024-02-19 PROCEDURE — 86255 FLUORESCENT ANTIBODY SCREEN: CPT

## 2024-02-19 PROCEDURE — 86900 BLOOD TYPING SEROLOGIC ABO: CPT

## 2024-02-19 PROCEDURE — C9399: CPT

## 2024-02-19 PROCEDURE — 86708 HEPATITIS A ANTIBODY: CPT

## 2024-02-19 PROCEDURE — 83735 ASSAY OF MAGNESIUM: CPT

## 2024-02-19 PROCEDURE — 85027 COMPLETE CBC AUTOMATED: CPT

## 2024-02-19 PROCEDURE — 81001 URINALYSIS AUTO W/SCOPE: CPT

## 2024-02-19 PROCEDURE — 85610 PROTHROMBIN TIME: CPT

## 2024-02-19 PROCEDURE — 86038 ANTINUCLEAR ANTIBODIES: CPT

## 2024-02-19 PROCEDURE — 86704 HEP B CORE ANTIBODY TOTAL: CPT

## 2024-02-19 PROCEDURE — 74183 MRI ABD W/O CNTR FLWD CNTR: CPT

## 2024-02-19 RX ORDER — OXYCODONE HYDROCHLORIDE 5 MG/1
1 TABLET ORAL
Qty: 12 | Refills: 0
Start: 2024-02-19 | End: 2024-02-21

## 2024-02-19 RX ORDER — POTASSIUM CHLORIDE 20 MEQ
40 PACKET (EA) ORAL ONCE
Refills: 0 | Status: COMPLETED | OUTPATIENT
Start: 2024-02-19 | End: 2024-02-19

## 2024-02-19 RX ORDER — MAGNESIUM SULFATE 500 MG/ML
1 VIAL (ML) INJECTION ONCE
Refills: 0 | Status: COMPLETED | OUTPATIENT
Start: 2024-02-19 | End: 2024-02-19

## 2024-02-19 RX ORDER — DOCUSATE SODIUM 100 MG
1 CAPSULE ORAL
Qty: 10 | Refills: 0
Start: 2024-02-19 | End: 2024-02-28

## 2024-02-19 RX ADMIN — AMPICILLIN SODIUM AND SULBACTAM SODIUM 200 GRAM(S): 250; 125 INJECTION, POWDER, FOR SUSPENSION INTRAMUSCULAR; INTRAVENOUS at 00:07

## 2024-02-19 RX ADMIN — AMPICILLIN SODIUM AND SULBACTAM SODIUM 200 GRAM(S): 250; 125 INJECTION, POWDER, FOR SUSPENSION INTRAMUSCULAR; INTRAVENOUS at 06:05

## 2024-02-19 RX ADMIN — Medication 60 MICROGRAM(S): at 05:32

## 2024-02-19 RX ADMIN — Medication 40 MILLIEQUIVALENT(S): at 10:05

## 2024-02-19 RX ADMIN — Medication 650 MILLIGRAM(S): at 06:02

## 2024-02-19 RX ADMIN — HEPARIN SODIUM 5000 UNIT(S): 5000 INJECTION INTRAVENOUS; SUBCUTANEOUS at 00:06

## 2024-02-19 RX ADMIN — Medication 100 GRAM(S): at 10:06

## 2024-02-19 RX ADMIN — AMPICILLIN SODIUM AND SULBACTAM SODIUM 200 GRAM(S): 250; 125 INJECTION, POWDER, FOR SUSPENSION INTRAMUSCULAR; INTRAVENOUS at 12:10

## 2024-02-19 RX ADMIN — Medication 650 MILLIGRAM(S): at 00:05

## 2024-02-19 RX ADMIN — Medication 650 MILLIGRAM(S): at 12:05

## 2024-02-19 RX ADMIN — Medication 600 MILLIGRAM(S): at 08:47

## 2024-02-19 RX ADMIN — HEPARIN SODIUM 5000 UNIT(S): 5000 INJECTION INTRAVENOUS; SUBCUTANEOUS at 08:49

## 2024-02-19 RX ADMIN — Medication 600 MILLIGRAM(S): at 03:50

## 2024-02-19 NOTE — PROGRESS NOTE ADULT - SUBJECTIVE AND OBJECTIVE BOX
No acute events overnight. Patient seen and examined at bedside. Reports that her pain is better today and she is able to tolerate PO intake without nausea or vomiting. ROS negative    Vital Signs Last 24 Hrs  T(C): 36.6 (02-19-24 @ 08:45), Max: 36.9 (02-18-24 @ 20:34)  T(F): 97.9 (02-19-24 @ 08:45), Max: 98.4 (02-18-24 @ 20:34)  HR: 72 (02-19-24 @ 08:45) (65 - 79)  BP: 117/65 (02-19-24 @ 08:45) (107/75 - 130/81)  BP(mean): 85 (02-19-24 @ 05:25) (83 - 85)  RR: 17 (02-19-24 @ 08:45) (12 - 17)  SpO2: 97% (02-19-24 @ 08:45) (96% - 98%)    Physical Exam  General: Young female in NAD  CV: RRR S1 S2  Lungs: CTABL no wheezing  Abd: Soft, NT/ND +SANJU drain with serosanguinous fluid  Ext: No edema  Neuro: AAOx3, non-focal    LABS:                        12.4   10.32 )-----------( 340      ( 19 Feb 2024 05:30 )             39.4     02-19    137  |  102  |  10  ----------------------------<  102<H>  3.6   |  26  |  0.50    Ca    8.7      19 Feb 2024 05:30  Phos  3.2     02-19  Mg     1.9     02-19    TPro  7.1  /  Alb  3.7  /  TBili  0.8  /  DBili  0.4<H>  /  AST  84<H>  /  ALT  233<H>  /  AlkPhos  266<H>  02-19    LIVER FUNCTIONS - ( 19 Feb 2024 05:30 )  Alb: 3.7 g/dL / Pro: 7.1 g/dL / ALK PHOS: 266 U/L / ALT: 233 U/L / AST: 84 U/L / GGT: x           PT/INR - ( 19 Feb 2024 05:30 )   PT: 12.0 sec;   INR: 1.05          PTT - ( 18 Feb 2024 05:30 )  PTT:34.9 sec  CAPILLARY BLOOD GLUCOSE        Urinalysis Basic - ( 19 Feb 2024 05:30 )    Color: x / Appearance: x / SG: x / pH: x  Gluc: 102 mg/dL / Ketone: x  / Bili: x / Urobili: x   Blood: x / Protein: x / Nitrite: x   Leuk Esterase: x / RBC: x / WBC x   Sq Epi: x / Non Sq Epi: x / Bacteria: x        Culture - Body Fluid with Gram Stain (collected 16 Feb 2024 20:04)  Source: Bile None  Gram Stain (16 Feb 2024 21:52):    No organisms seen    No White blood cells  Preliminary Report (17 Feb 2024 08:11):    No growth to date        RADIOLOGY, EKG & ADDITIONAL TESTS: Reviewed.       MEDICATIONS  (STANDING):  acetaminophen     Tablet .. 650 milliGRAM(s) Oral every 6 hours  ampicillin/sulbactam  IVPB 3 Gram(s) IV Intermittent every 6 hours  heparin   Injectable 5000 Unit(s) SubCutaneous every 8 hours  ibuprofen  Tablet. 600 milliGRAM(s) Oral every 6 hours  lamoTRIgine 300 milliGRAM(s) Oral daily  levothyroxine Injectable 60 MICROGram(s) IV Push at bedtime    MEDICATIONS  (PRN):  HYDROmorphone  Injectable 0.25 milliGRAM(s) IV Push every 4 hours PRN Breakthrough pain only  ondansetron Injectable 4 milliGRAM(s) IV Push every 6 hours PRN Nausea and/or Vomiting  oxyCODONE    IR 2.5 milliGRAM(s) Oral every 6 hours PRN Moderate Pain (4 - 6)  oxyCODONE    IR 5 milliGRAM(s) Oral every 6 hours PRN Severe Pain (7 - 10)

## 2024-02-19 NOTE — DISCHARGE NOTE PROVIDER - NSDCFUADDINST_GEN_ALL_CORE_FT
Oxycodone [facility] preferred pharmacy. Please take the entire colace prescription if you take even one oxycodone    General Discharge Instructions:  Please resume all regular home medications unless specifically advised not to take a particular medication. Also, please take any new medications as prescribed.  Please get plenty of rest, continue to ambulate several times per day, and drink adequate amounts of fluids. Avoid lifting weights greater than 5-10 lbs until you follow-up with your surgeon, who will instruct you further regarding activity restrictions.  Avoid driving or operating heavy machinery while taking pain medications.  Please follow-up with your surgeon and Primary Care Provider (PCP) as advised.  Incision Care:  *Please call your doctor or nurse practitioner if you have increased pain, swelling, redness, or drainage from the incision site.  *Avoid swimming and baths until your follow-up appointment.  *You may shower, and wash surgical incisions with a mild soap and warm water. Gently pat the area dry.  *If you have steri-strips, they will fall off on their own. Please remove any remaining strips 7-10 days after surgery.    Warning Signs:  Please call your doctor or nurse practitioner if you experience the following:  *You experience new chest pain, pressure, squeezing or tightness.  *New or worsening cough, shortness of breath, or wheeze.  *If you are vomiting and cannot keep down fluids or your medications.  *You are getting dehydrated due to continued vomiting, diarrhea, or other reasons. Signs of dehydration include dry mouth, rapid heartbeat, or feeling dizzy or faint when standing.  *You see blood or dark/black material when you vomit or have a bowel movement.  *You experience burning when you urinate, have blood in your urine, or experience a discharge.  *Your pain is not improving within 8-12 hours or is not gone within 24 hours. Call or return immediately if your pain is getting worse, changes location, or moves to your chest or back.  *You have shaking chills, or fever greater than 101.5 degrees Fahrenheit or 38 degrees Celsius.  *Any change in your symptoms, or any new symptoms that concern you.

## 2024-02-19 NOTE — PROGRESS NOTE ADULT - SUBJECTIVE AND OBJECTIVE BOX
GI Consult Progress Note:     OVERNIGHT EVENTS: JOSE.    SUBJECTIVE / INTERVAL HPI:   Patient seen and examined at bedside. States that overall she feels much better. Bilirubin and liver enzymes down-trending now s/p antibiotic change       VITAL SIGNS:  Vital Signs Last 24 Hrs  T(C): 36.6 (19 Feb 2024 08:45), Max: 36.9 (18 Feb 2024 20:34)  T(F): 97.9 (19 Feb 2024 08:45), Max: 98.4 (18 Feb 2024 20:34)  HR: 72 (19 Feb 2024 08:45) (65 - 79)  BP: 117/65 (19 Feb 2024 08:45) (107/75 - 124/83)  BP(mean): 85 (19 Feb 2024 05:25) (83 - 85)  RR: 17 (19 Feb 2024 08:45) (17 - 17)  SpO2: 97% (19 Feb 2024 08:45) (96% - 98%)    Parameters below as of 19 Feb 2024 08:45  Patient On (Oxygen Delivery Method): room air        02-18-24 @ 07:01  -  02-19-24 @ 07:00  --------------------------------------------------------  IN: 1345 mL / OUT: 2205 mL / NET: -860 mL    02-19-24 @ 07:01  -  02-19-24 @ 16:00  --------------------------------------------------------  IN: 100 mL / OUT: 1410 mL / NET: -1310 mL      PHYSICAL EXAM:  General: No acute distress  Lungs: Normal respiratory effort and no intercostal retractions  Cardiovascular: RRR  Abdomen: Soft, non-tender, non-distended, SANJU drain now removed   Neurological: Alert and oriented x3  Skin: Warm and dry. No obvious rash     MEDICATIONS:  MEDICATIONS  (STANDING):  acetaminophen     Tablet .. 650 milliGRAM(s) Oral every 6 hours  ampicillin/sulbactam  IVPB 3 Gram(s) IV Intermittent every 6 hours  heparin   Injectable 5000 Unit(s) SubCutaneous every 8 hours  ibuprofen  Tablet. 600 milliGRAM(s) Oral every 6 hours  lamoTRIgine 300 milliGRAM(s) Oral daily  levothyroxine Injectable 60 MICROGram(s) IV Push at bedtime    MEDICATIONS  (PRN):  HYDROmorphone  Injectable 0.25 milliGRAM(s) IV Push every 4 hours PRN Breakthrough pain only  ondansetron Injectable 4 milliGRAM(s) IV Push every 6 hours PRN Nausea and/or Vomiting  oxyCODONE    IR 2.5 milliGRAM(s) Oral every 6 hours PRN Moderate Pain (4 - 6)  oxyCODONE    IR 5 milliGRAM(s) Oral every 6 hours PRN Severe Pain (7 - 10)      ALLERGIES:  Allergies    No Known Allergies    Intolerances        LABS:                        12.4   10.32 )-----------( 340      ( 19 Feb 2024 05:30 )             39.4     02-19    137  |  102  |  10  ----------------------------<  102<H>  3.6   |  26  |  0.50    Ca    8.7      19 Feb 2024 05:30  Phos  3.2     02-19  Mg     1.9     02-19    TPro  7.1  /  Alb  3.7  /  TBili  0.8  /  DBili  0.4<H>  /  AST  84<H>  /  ALT  233<H>  /  AlkPhos  266<H>  02-19    PT/INR - ( 19 Feb 2024 05:30 )   PT: 12.0 sec;   INR: 1.05          PTT - ( 18 Feb 2024 05:30 )  PTT:34.9 sec  Urinalysis Basic - ( 19 Feb 2024 05:30 )    Color: x / Appearance: x / SG: x / pH: x  Gluc: 102 mg/dL / Ketone: x  / Bili: x / Urobili: x   Blood: x / Protein: x / Nitrite: x   Leuk Esterase: x / RBC: x / WBC x   Sq Epi: x / Non Sq Epi: x / Bacteria: x      CAPILLARY BLOOD GLUCOSE  RADIOLOGY & ADDITIONAL TESTS: Reviewed.

## 2024-02-19 NOTE — PROGRESS NOTE ADULT - ASSESSMENT
37yo Female pt with PMHx bipolar II disorder, hypothyroidism, PSHx c-sections x2 (2019, 2023), who presents with x2 day hx of severe RUQ abdominal pain, with nausea and vomiting consistent with acute cholecystitis now s/p lap behzad. Still with uptrending T. Bili. Pending AM labs to differentiate etiology, and discussion with GI.     Low Fat Diet  pain/nausea control PRN   holding Phentermine   Unasyn  SANJU  GI  AM labs

## 2024-02-19 NOTE — DISCHARGE NOTE PROVIDER - NSDCFUADDAPPT_GEN_ALL_CORE_FT
Please follow up with Dr. Shepard in 1-2 weeks. Call at your earliest convenience to make an appointment.

## 2024-02-19 NOTE — PROGRESS NOTE ADULT - ASSESSMENT
38F with PMH of bipolar II disorder, hypothyroidism, PSHx c-sections x2 (2019, 2023), who presents with x2 day hx of severe RUQ abdominal pain, with nausea and vomiting consistent with acute cholecystitis now s/p lap behzad on 0216. GI consulted due to elevated bilirubin to 2.7 s/p lap behzad.     RUQ US: Cholelithiasis without acute signs of acute cholecystitis.  MRCP: No biliary dilatation or choledocholithiasis status post cholecystectomy.    Suspect elevations in bilirubin, alk phos, and liver enzymes possibly 2/2 DILI (Augmentin and Ceftriaxone) as MRCP did not identify any obstructive pathology at this time. Also possible that patient has passed a gallstone, and as a result, has lingering elevations in a cholestatic pattern. Case reviewed with Hepatology Team as well.     Recommendations;   - trend CBC, CMP, and INR daily with AM labs   - avoid further use of ceftriaxone and Augmentin as suspect DILI   - agree with switching to Unasyn at this time   - no plans for ERCP currently, so can advance patient to low fat diet if okay with primary team   - f/u SWAPNIL, AMA, ASMA, IgG, IgM, alpha-1 anti-trypsin, and ceruloplasmin   - ensure out-patient follow-up with hepatology and repeat CMP within 1 week     [facility]  [address] [place] [address], [address]th [place] [address]     Case discussed with Dr. Trevizo and Dr. Medeiros. GI Team will continue to follow.     Laurence Ledezma D.O.   Gastroenterology Fellow  Weekday 7am-5pm Pager: [phone]  Weeknights/Weekend/Holiday Coverage: Please call the  for contact

## 2024-02-19 NOTE — DISCHARGE NOTE PROVIDER - HOSPITAL COURSE
37yo Female pt with PMHx bipolar II disorder, hypothyroidism, PSHx c-sections x2 (2019, 2023), who presents with x2 day hx of severe RUQ abdominal pain, with nausea and vomiting.    Patient reports that she went to [place] x2 days ago and after she landed, she was eating unhealthy foods and started experiencing severe RUQ abdominal pain. She went to [facility] and was worked up and ultimately found to have symptomatic cholelithiasis on RUQ US, however with a WBC 20, and RUQ tenderness, more suspicious for early acute cholecystitis. She was given IV abx and was offered a cholecystectomy but preferred to be evaluated in [facility]. She reports that she has had an episode of nausea and vomiting x1 last month, but otherwise no similar symptoms.      In the ED, VS wnl. On exam, soft, ND, Mild ttp in RUQ and epigastrium. +[place]. No rebound or guarding. Labs significant for WBC 9 (20 yesterday at [facility]), Hgb 13.3, T bili 0.6, , AST//168. Abdominal US showing cholelithiasis without signs of acute cholecystitis, CBD measures up to 6mm.    Patient admitted for acute cholecystitis and added on to OR schedule. Cholecystectomy performed 2/16/24. PACU stay uneventful. Overnight passed TOV. POD1 patient tolerated low fat diet. T Bili noted to be increased to 2.7 from 1.7. POD2 T bili again elevated to 3.4, MRCP performed showing no stones or CBD dilatation. ABx stopped and started on unasyn. Overnight uneventful, patient tolerating diet, pain well controlled, ambulating. POD 3 patient T. Bili 0.8. Hemodynamically stable and meeting all milestones. Discharged to home with close follow up.

## 2024-02-19 NOTE — DISCHARGE NOTE PROVIDER - NSDCMRMEDTOKEN_GEN_ALL_CORE_FT
acetaminophen 325 mg oral tablet: 3 tab(s) orally every 8 hours  Colace 100 mg oral capsule: 1 cap(s) orally once a day  ibuprofen 600 mg oral tablet: 1 tab(s) orally every 6 hours  Labs: CBC, BMP, Hepatic Function Panel, Serum Mag, Serum Phos: Labs in 1 week, 2/26  lamoTRIgine 100 mg oral tablet: 1 tab(s) orally once a day  lamoTRIgine 200 mg oral tablet: 1 tab(s) orally once a day  levothyroxine 75 mcg (0.075 mg) oral tablet: 1 tab(s) orally once a day  oxyCODONE 5 mg oral tablet: 1 tab(s) orally every 6 hours as needed for  severe pain MDD: 4

## 2024-02-19 NOTE — DISCHARGE NOTE PROVIDER - CARE PROVIDER_API CALL
Isaac Shepard  Surgery  [address], [address]  [place], [place] [address]  Phone: [phone]  Fax: [phone]  Follow Up Time: 2 weeks

## 2024-02-19 NOTE — PROGRESS NOTE ADULT - ATTENDING COMMENTS
Doing ok overall.  Pain is controlled.  Afebrile.  Labs noted.  Discussed with Dr. Trevizo, GI.  GI service would like MRCP to asses for the LFTs elevation reason.  Await MRCP.  Most likely will need ERCP.  SANJU - serosanguinous output.   Patient and  updated. All questions answered.
Pt seen and d/w fellow and Dr. Medeiros of hepatology.  F/u hepatitis serologies, repeat LFTs.  Avoid Ceftriaxone and Augmentin.  No ERCP at this juncture.
Pt seen and d/w fellow.  LFTs significantly improved since yesterday.  MRCP negative.  Discharged home.  Will need hepatology f/u.  Check LFTs later this week.

## 2024-02-19 NOTE — PROGRESS NOTE ADULT - ASSESSMENT
38F with bipolar disorder, hypothyroidism who presented with RUQ abdominal pain, associated with nausea and vomiting. Presentation consistent with acute cholecystitis. She is now s/p laparoscopic cholecystectomy (2/16). Medicine is consulted for co-management.    #Acute cholecystitis s/p laparoscopic cholecystectomy (2/16)  #Post-operative state  - Pain/nausea control  - Broad spectrum abx  - Diet as tolerated  - OOB to ambulation  - Incentive spirometry  - DVT ppx as per primary team    #Transaminitis with hyperbilirubinemia  - MRCP negative, LFTs and bilirubin now trending down  - GI/hepatology following    #Hypothyroidism  - On levothyroxine, continue the same    #Bipolar disorder  - On Lamotrigine, continue the same

## 2024-02-19 NOTE — DISCHARGE NOTE PROVIDER - INSTRUCTIONS
Please continue a low fat diet, this can include:  Cereals, whole grains, and whole grain pasta products  corn or whole wheat tortillas, baked crackers, noodles, especially whole grain versions, oatmeal, rice, English muffins    Dairy products can be high in fat, but food manufacturers often offer lower fat versions. These include: fat free cheese or yogurt or cream cheese    Protein sources: Tofu, beans, lentils, egg whites, lean cuts of meat, lentils, tuna, peas, shrimp, skinless chicken or turkey breast  veggie burgers, Fruits and vegetables are naturally low fat. Choose fresh, frozen, or canned options.   Patient

## 2024-02-19 NOTE — DISCHARGE NOTE NURSING/CASE MANAGEMENT/SOCIAL WORK - PATIENT PORTAL LINK FT
You can access the FollowMyHealth Patient Portal offered by [facility] by registering at the following website: [email or web address]. By joining [employer]’s FollowMyHealth portal, you will also be able to view your health information using other applications (apps) compatible with our system.

## 2024-02-20 LAB
IGA FLD-MCNC: 97 MG/DL — SIGNIFICANT CHANGE UP (ref 84–499)
IGG FLD-MCNC: 1098 MG/DL — SIGNIFICANT CHANGE UP (ref 610–1660)
IGM SERPL-MCNC: 244 MG/DL — HIGH (ref 35–242)
KAPPA LC SER QL IFE: 1.57 MG/DL — SIGNIFICANT CHANGE UP (ref 0.33–1.94)
KAPPA/LAMBDA FREE LIGHT CHAIN RATIO, SERUM: 1.29 RATIO — SIGNIFICANT CHANGE UP (ref 0.26–1.65)
LAMBDA LC SER QL IFE: 1.22 MG/DL — SIGNIFICANT CHANGE UP (ref 0.57–2.63)
MITOCHONDRIA AB SER-ACNC: SIGNIFICANT CHANGE UP
SMOOTH MUSCLE AB SER-ACNC: SIGNIFICANT CHANGE UP

## 2024-02-21 LAB
ANA TITR SER: NEGATIVE — SIGNIFICANT CHANGE UP
CULTURE RESULTS: NO GROWTH — SIGNIFICANT CHANGE UP
SPECIMEN SOURCE: SIGNIFICANT CHANGE UP

## 2024-02-23 DIAGNOSIS — K71.9 TOXIC LIVER DISEASE, UNSPECIFIED: ICD-10-CM

## 2024-02-23 DIAGNOSIS — Z98.890 OTHER SPECIFIED POSTPROCEDURAL STATES: ICD-10-CM
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
## 2024-02-23 DIAGNOSIS — Z79.1 LONG TERM (CURRENT) USE OF NON-STEROIDAL ANTI-INFLAMMATORIES (NSAID): ICD-10-CM

## 2024-02-23 DIAGNOSIS — E83.42 HYPOMAGNESEMIA: ICD-10-CM

## 2024-02-23 DIAGNOSIS — E03.9 HYPOTHYROIDISM, UNSPECIFIED: ICD-10-CM

## 2024-02-23 DIAGNOSIS — F31.81 BIPOLAR II DISORDER: ICD-10-CM
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
## 2024-02-23 DIAGNOSIS — K80.20 CALCULUS OF GALLBLADDER WITHOUT CHOLECYSTITIS WITHOUT OBSTRUCTION: ICD-10-CM

## 2024-02-23 DIAGNOSIS — K80.00 CALCULUS OF GALLBLADDER WITH ACUTE CHOLECYSTITIS WITHOUT OBSTRUCTION: ICD-10-CM

## 2024-02-23 DIAGNOSIS — Z79.890 HORMONE REPLACEMENT THERAPY: ICD-10-CM

## 2024-02-23 DIAGNOSIS — T36.0X5A ADVERSE EFFECT OF PENICILLINS, INITIAL ENCOUNTER: ICD-10-CM

## 2024-02-23 LAB — SURGICAL PATHOLOGY STUDY: SIGNIFICANT CHANGE UP

## 2025-02-03 NOTE — ED ADULT NURSE NOTE - PAIN RATING/NUMBER SCALE (0-10): ACTIVITY
RE: Plan of Care    KELL Ibarra    Thank you for referring Shabana Deleon. The following information reflects my assessment and plan of care.    Please review and sign the attached form to indicate your approval of the plan of care. Insurance compliance requires your approval be on this plan of care. After your review, please fax back all pages received. Should you have any questions, feel free to contact me.    Mary Guo PT  [facility], [address] [place]  [address]  [address]  [place] [address]  Phone: [phone]  Fax: [phone]           Plan of Care 2/3/25   Effective from: 2/3/2025  Effective to: 2025    Plan ID: [id number]            Participants as of Finalize on 2/3/2025    Name Type Comments Contact Info    KELL Ibarra Referring Provider  [phone]    Mary Guo, PT Physical Therapist  [phone]           Shabana Deleon MRN:[id number] (:1971 53 year old F)             Evaluation     Author: Mary Guo, PT Status: Signed Last edited: 2/3/2025  2:30 PM       Physical Therapy Evaluation    Visit Type: Initial Evaluation  Visit: 1  Referring Provider: KELL Ibarra  Medical Diagnosis (from order): R39.15 - Urinary urgency   Treatment Diagnosis: pelvic health - impaired bladder health, impaired strength and impaired motor function/performance/coordination.  Onset  - Date of onset: 2 years ago  Chart reviewed at time of initial evaluation (relevant co-morbidities, allergies, tests and medications listed):   Past Medical History:  2012: Allergic rhinitis, cause unspecified  2009: Atypical squamous cell changes of undetermined significance   favor benign (ASCUS favor benign)  2005: Atypical squamous cells of undetermined significance on Pap   smear  2019: CRI (chronic renal insufficiency), stage 2 (mild)  10/13/2015: Family history of diabetes mellitus  No date: Fibroadenoma of breast  2012: Generalized anxiety  disorder  No date: Hypercholesterolemia      Comment:  Goal LDL < 100  10/13/2015: IFG (impaired fasting glucose)  08/17/2012: Major depressive disorder with single episode, in partial   remission (CMD)  10/13/2015: Obesity (BMI 30-39.9)  No date: Radiculopathy  No date: Seizure  (CMD)      Comment:  one time at age 24  12/23/2019: Vitamin D deficiency disease  Current Outpatient Medications:  Krill Oil 300 MG Cap,   pantoprazole (PROTONIX) 40 MG tablet, Take 1 tablet by mouth daily.  LORazepam (ATIVAN) 0.5 MG tablet, Take 1 tablet by mouth daily as needed for Anxiety.  sertraline (ZOLOFT) 100 MG tablet, Take 1 tablet by mouth daily.  tamoxifen (NOLVADEX) 20 MG tablet, TAKE 1 TABLET BY MOUTH DAILY  Multiple Vitamins-Minerals (MULTIVITAMIN PO), Take 1 tablet by mouth daily.    No current facility-administered medications for this visit.          SUBJECTIVE                                                                                                               Patient reports urinary leakage and urgency and difficulty controlling urgency.  Patient reports some triggered urgency with sudden movements, getting out of bed, and urgency to void.  She reports increased urinary frequency - emptying every 1-2 hours.  She is typically using a pad during the day.   States when she empties - bladder usually does feel full.      Perimenopausal - last period October 2024.    Fluid intake: 1.5 cups coffee, water 80 ounces   Nocturia 0-1 time    Patient states when she uses a menstrual cup she has less urgency and leakage.      Walking and elliptical for exercise      Pain / Symptoms  - Quality / Description:         Bladder: urgency, frequency, leakage on way to restroom and leakage with activities       Pelvic bowel symptoms: bowels are regular.    Function:   Prior Level of Function: no bladder symptoms,    Patient Goals: decrease leakage and able to delay toileting.    Prior treatment  - no therapies  - Discharged from  hospital, home health, or skilled nursing facility in last 30 days: no  Home Environment   - Patient lives with: significant other  - Type of home: single level home  - Assistance available: as needed  - Denies 2 or more falls or an unexplained fall with injury in the last year.  - Feel safe at home / work / school: yes      OBJECTIVE                                                                                                                    Observation   Special Tests:  In standing iliac crests left side higher  moderate myofascial restrictions in low back  Lumbar ROM WFLs except left sidebending - limited by right side body tightness  Load transfer in single limb stance unable to stabilize pelvic bilaterally - noted glut weakness                  Pelvic Health  Normal Exam(s): external exam  Internal Vaginal Exam  - Laxity: vaginal and anterior  Mild pressure/tenderness to palpation of deep pelvic floor but no pain  Pelvic Floor  - Strength: 3-fair squeeze, definite lift  - Elongation and relaxation: normal ability to relax and elongate  - Contract response: accessory (abs, gluts)    Outcome/Assessments  Patient Specific Functional Scale:   Activity: Delaying urgency 5-10 minutes, Score: 6  Activity: Get out of bed without leaking, Score: 5  Activity: Voiding interval of > 2 hours, Score: 0  Average Score: 3.7  Each activity is scored: 0=unable to perform activity to 10=able to perform activity at the same level as before injury or problem      Treatment     Therapeutic Exercise  Patient educated and instructed in completion of:   Pelvic floor muscle anatomy explained  Verbal and manual cuing for isolated pelvic floor muscle contraction, relaxation and lengthening  Pelvic floor graded contraction  Pelvic floor and breathing coordination  Pelvic floor facilitation with adduction    Activities of Daily Living/Self Care     Bladder Basics:  Average bladder capacity 2 cups  Normal voiding of 6-8 times per  day.  2-5 hour frequency  Avoiding too frequent emptying  Fluid consumption for bladder health  Foods and fluids that may affect the bladder  Foods and fluids that may cause bladder irritation  Patient was instructed in filling out a bladder diary.     Patient was educated on bladder habit training.  This included normal voiding interval of 2-5 hours with 7 trips to bathroom during the day and 1-2 times per night.  She was also educated about urge suppression/delay techniques of   1. Stopping activity sitting or standing still  2. Gentle pelvic floor muscle contraction (we did also review anatomy of pelvic floor)  3. Deep breathing to relax  4. Distraction as needed      Skilled input: as detailed above    Writer verbally educated and received verbal consent for hand placement, positioning of patient, and techniques to be performed today from patient for clothing adjustments for techniques and hand placement and palpation for techniques as described above and how they are pertinent to the patient's plan of care.  Verbal consent received today for internal and external pelvic floor muscle assessment and treatment.   Patient provided continued consent during evaluation and treatment.  Home Exercise Program  Access Code: [email or web address]  URL: [email or web address]  Date: 02/03/2025  Prepared by: Mary Guo    Exercises  - Seated Pelvic Floor Elevators  - 2 x daily - 7 x weekly - 1 sets - 10 reps  - Seated Diaphragmatic Breathing  - 2 x daily - 7 x weekly - 1 sets - 10 reps  - Seated Pelvic Floor Contraction with Isometric Hip Adduction  - 2 x daily - 7 x weekly - 10 reps - 5-10 hold  - Pelvic Floor Contractions in [place] with Adduction  - 1 x daily - 7 x weekly - 10 reps - 5-10 seconds hold  Voiding diary  Urge control      ASSESSMENT                                                                                                          53 year old patient has reported functional limitations listed  above impacted by signs and symptoms consistent with treatment diagnosis below.  Treatment Diagnosis:   - Involved: pelvic health.  - Symptoms/impairments: impaired bladder health, impaired strength and impaired motor function/performance/coordination.    Patient with pelvic floor and lower abdominal muscle weakness, anterior vaginal wall laxity, perimenopausal, history of three vaginal deliveries, and difficulty with load transfer.     Prognosis: Patient will benefit from skilled therapy.  Rehabilitative potential is: good.  Predicted patient presentation: Moderate (evolving) - Patient comorbidities and complexities, as defined above, may have varying impact on steady progress for prescribed plan of care.  Education:   - Present and ready to learn: patient  - Results of above outlined education: Verbalizes understanding    PLAN                                                                                                                         The following skilled interventions to be implemented to achieve goals listed below:  Neuromuscular Re-Education (29499)  Therapeutic Activity (76582)  Therapeutic Exercise (70767)  Manual Therapy (84006)  Activities of Daily Living/Self Care (67387)  Electrical Stimulation Unattended (78342 or )    Frequency / Duration  2 times per month tapering as patient progresses for 4 months for an estimated total of 8 visits    Patient involved in and agreed to plan of care and goals.  Patient given attendance policy at time of initial evaluation.    Suggestions for next session as indicated: Progress per plan of care, pelvic floor and lower abdominal strengthening, lumbar and thoracic stretching  Follow up on urge control, review diary    Goals  Long Term Goals: to be met by end of plan of care  1. Patient to be independent with home exercise program to address bladder symptoms.  2. Patient will complete sit to stand 75% of the time without urinary incontinence.   3. Patient  will control urinary urgency with > 75% reduction in bladder leakage.    4. Patient Specific Functional Scale (PSFS) will improve an average score 6/10 (minimal detectable change (90%CI) for average score is 2 points, for single activity score is 3 points)      Therapy procedure time and total treatment time can be found documented on the Time Entry flowsheet           Current Participants as of 2/3/2025    Name Type Comments Contact Info    KELL Ibarra Referring Provider  [phone]    Signature pending    Mary Guo PT Physical Therapist  [phone]    Signature pending          RE: Plan of Care for Shabana Deleon, YOB: 1971     I certify the need for these services, furnished under this plan of treatment and while under my care.  I agree with the plan of care as stated and request that therapy proceed.        __________________________________________________________________________________  Provider Signature         Date    0 (no pain/absence of nonverbal indicators of pain)

## 2025-02-05 NOTE — H&P ADULT - REASON FOR ADMISSION
Discontinue Regimen: Betamethasone cream
Initiate Treatment: Clobetasol cream - apply twice a day as directed
Plan: Offered punch biopsy today, patient declined. Will consider punch biopsy if the future if condition not improved at follow up.
acute cholecystitis
Detail Level: Zone

## 2025-07-30 ENCOUNTER — ASOB RESULT (OUTPATIENT)
Age: 40
End: 2025-07-30

## 2025-07-30 ENCOUNTER — APPOINTMENT (OUTPATIENT)
Dept: ANTEPARTUM | Facility: CLINIC | Age: 40
End: 2025-07-30

## 2025-09-15 ENCOUNTER — ASOB RESULT (OUTPATIENT)
Age: 40
End: 2025-09-15

## 2025-09-15 ENCOUNTER — APPOINTMENT (OUTPATIENT)
Dept: ANTEPARTUM | Facility: CLINIC | Age: 40
End: 2025-09-15
Payer: COMMERCIAL

## 2025-09-15 PROCEDURE — 76811 OB US DETAILED SNGL FETUS: CPT

## 2025-09-15 PROCEDURE — 76817 TRANSVAGINAL US OBSTETRIC: CPT

## (undated) DEVICE — TROCAR COVIDIEN VERSAONE FIXATION CANNULA 5MM

## (undated) DEVICE — SUT MONOCRYL 4-0 18" PS-2

## (undated) DEVICE — GLV 7.5 PROTEXIS (WHITE)

## (undated) DEVICE — SUT PDS II 0 18" ENDOLOOP LIGATURE

## (undated) DEVICE — ENDOCATCH 10MM SPECIMEN POUCH

## (undated) DEVICE — POSITIONER FOAM EGG CRATE ULNAR 2PCS (PINK)

## (undated) DEVICE — TROCAR COVIDIEN VERSAONE BLUNT TIP HASSAN 12MM

## (undated) DEVICE — SUT VICRYL 0 27" UR-6

## (undated) DEVICE — PACK SPINE

## (undated) DEVICE — TIP METZENBAUM SCISSOR MONOPOLAR ENDOCUT (ORANGE)

## (undated) DEVICE — TUBING STRYKER PNEUMOSURE HI FLOW INSUFFLATOR

## (undated) DEVICE — WARMING BLANKET UPPER ADULT

## (undated) DEVICE — SOL IRR BAG NS 0.9% 3000ML

## (undated) DEVICE — ELCTR GROUNDING PAD ADULT COVIDIEN

## (undated) DEVICE — LIGASURE MARYLAND 37CM

## (undated) DEVICE — ELCTR BOVIE PENCIL HANDPIECE ROCKER SWITCH 15FT

## (undated) DEVICE — TROCAR COVIDIEN VERSAPORT BLADELESS OPTICAL 5MM STANDARD

## (undated) DEVICE — VENODYNE/SCD SLEEVE CALF MEDIUM

## (undated) DEVICE — GLV 8 PROTEXIS (WHITE)

## (undated) DEVICE — PACK GENERAL LAPAROSCOPY

## (undated) DEVICE — GOWN XL

## (undated) DEVICE — Device

## (undated) DEVICE — DRSG DERMABOND 0.7ML